# Patient Record
Sex: MALE | Race: WHITE | URBAN - METROPOLITAN AREA
[De-identification: names, ages, dates, MRNs, and addresses within clinical notes are randomized per-mention and may not be internally consistent; named-entity substitution may affect disease eponyms.]

---

## 2017-03-07 ENCOUNTER — PRE VISIT (OUTPATIENT)
Dept: CARDIOLOGY | Facility: CLINIC | Age: 82
End: 2017-03-07

## 2017-03-15 ENCOUNTER — OFFICE VISIT (OUTPATIENT)
Dept: CARDIOLOGY | Facility: CLINIC | Age: 82
End: 2017-03-15
Attending: INTERNAL MEDICINE
Payer: COMMERCIAL

## 2017-03-15 VITALS
SYSTOLIC BLOOD PRESSURE: 130 MMHG | HEIGHT: 66 IN | OXYGEN SATURATION: 96 % | WEIGHT: 169 LBS | HEART RATE: 55 BPM | BODY MASS INDEX: 27.16 KG/M2 | DIASTOLIC BLOOD PRESSURE: 80 MMHG

## 2017-03-15 DIAGNOSIS — I49.3 PVC (PREMATURE VENTRICULAR CONTRACTION): ICD-10-CM

## 2017-03-15 DIAGNOSIS — I48.20 CHRONIC ATRIAL FIBRILLATION (H): ICD-10-CM

## 2017-03-15 DIAGNOSIS — I43 CARDIOMYOPATHY IN DISEASES CLASSIFIED ELSEWHERE (H): ICD-10-CM

## 2017-03-15 DIAGNOSIS — I48.91 ATRIAL FIBRILLATION WITH RVR (H): ICD-10-CM

## 2017-03-15 DIAGNOSIS — I63.49 CEREBROVASCULAR ACCIDENT (CVA) DUE TO EMBOLISM OF OTHER CEREBRAL ARTERY (H): ICD-10-CM

## 2017-03-15 PROCEDURE — 99214 OFFICE O/P EST MOD 30 MIN: CPT | Performed by: INTERNAL MEDICINE

## 2017-03-15 NOTE — PROGRESS NOTES
"Cardiology Progress Note          Assessment and Plan:     1. Paroxysmal atrial fibrillation    Urged patient to resume Xarelto given his past history of stroke    Continue to watch for symptoms and recurrence.    Follow up in six months per patient's wife.    This note was transcribed using electronic voice recognition software and there may be typographical errors present.                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  He also had stroke.  He has self discontinued his Xarelto.  Thankfully he has not had a recurrent stroke.  He denies any tachycardia.  He has occasional palpitations.  He likely has chronotropic incompetence and stable dyspnea on exertion.                       Review of Systems:   Review of Systems:  Skin:  Negative     Eyes:  Positive for glasses  ENT:  Positive for hearing loss  Respiratory:  Positive for dyspnea on exertion  Cardiovascular:    Positive for;palpitations  Gastroenterology: Negative    Genitourinary:  not assessed    Musculoskeletal:  Positive for joint pain;arthritis  Neurologic:  Positive for stroke  Psychiatric:  Negative    Heme/Lymph/Imm:  Positive for allergies  Endocrine:  Negative                Physical Exam:     Vitals: /80  Pulse 55  Ht 1.676 m (5' 6\")  Wt 76.7 kg (169 lb)  SpO2 96%  BMI 27.28 kg/m2  Constitutional:  cooperative;alert and oriented   Non English speaking    Skin:  warm and dry to the touch        Head:  normocephalic, no masses or lesions        Eyes:  conjunctivae and lids unremarkable;sclera white        ENT:  no pallor or cyanosis, dentition good        Neck:  no carotid bruit;JVP normal        Chest:  clear to auscultation;normal symmetry        Cardiac: regular rhythm bradycardic     grade 1;systolic murmur;RUSB          Abdomen:      benign    Extremities and Back:  no edema;no " deformities, clubbing, cyanosis, erythema observed        Neurological:  affect appropriate, oriented to time, person and place                 Medications:     Current Outpatient Prescriptions   Medication Sig Dispense Refill     rivaroxaban ANTICOAGULANT (XARELTO) 15 MG TABS tablet Take 1 tablet (15 mg) by mouth daily (with dinner) 90 tablet 3     aspirin 81 MG tablet Take 1 tablet (81 mg) by mouth daily 30 tablet 11     Cholecalciferol (VITAMIN D3 PO) Take 50,000 Units by mouth every 7 days        [DISCONTINUED] rivaroxaban ANTICOAGULANT (XARELTO) 15 MG TABS tablet Take 1 tablet (15 mg) by mouth daily (with dinner) 30 tablet 11     polyethylene glycol (MIRALAX/GLYCOLAX) powder MIX 17GRAMS IN 8OZ OF WATER & DRINK TWICE A DAY AS NEEDED 1 Bottle 1                Data:   All laboratory data reviewed  No results found for this or any previous visit (from the past 24 hour(s)).    All laboratory data reviewed  Lab Results   Component Value Date    CHOL 153 02/04/2016     Lab Results   Component Value Date    HDL 66 02/04/2016     Lab Results   Component Value Date    LDL 65 02/04/2016     Lab Results   Component Value Date    TRIG 110 02/04/2016     Lab Results   Component Value Date    CHOLHDLRATIO 1.4 08/13/2015     TSH   Date Value Ref Range Status   03/20/2015 0.86 0.40 - 4.00 mU/L Final     Comment:     Effective 7/30/2014, the reference range for this assay has changed to reflect   new instrumentation/methodology.       Last Basic Metabolic Panel:  Lab Results   Component Value Date     02/04/2016      Lab Results   Component Value Date    POTASSIUM 4.3 02/04/2016     Lab Results   Component Value Date    CHLORIDE 106 02/04/2016     Lab Results   Component Value Date    JALEESA 9.2 02/04/2016     Lab Results   Component Value Date    CO2 23 05/18/2015     Lab Results   Component Value Date    BUN 29 02/04/2016     Lab Results   Component Value Date    CR 1.22 02/04/2016     Lab Results   Component Value Date     GLC 99 02/04/2016     Lab Results   Component Value Date    WBC 7.1 02/04/2016     Lab Results   Component Value Date    RBC 4.67 02/04/2016     Lab Results   Component Value Date    HGB 14.6 02/04/2016     Lab Results   Component Value Date    HCT 43.6 02/04/2016     Lab Results   Component Value Date    MCV 93 02/04/2016     Lab Results   Component Value Date    MCH 31.3 02/04/2016     Lab Results   Component Value Date    MCHC 33.5 02/04/2016     Lab Results   Component Value Date    RDW 12.8 02/04/2016     Lab Results   Component Value Date     02/04/2016

## 2017-03-15 NOTE — MR AVS SNAPSHOT
"              After Visit Summary   3/15/2017    Delta Otoole    MRN: [id number]           Patient Information     Date Of Birth          10/10/1931        Visit Information        Provider Department      3/15/2017 2:45 PM Chino Smith MD; [employer] [facility] AT [place]        Today's Diagnoses     Atrial fibrillation with RVR (H)        Cerebrovascular accident (CVA) due to embolism of other cerebral artery (H)        Cardiomyopathy in diseases classified elsewhere (H)        PVC (premature ventricular contraction)        Chronic atrial fibrillation (H)           Follow-ups after your visit        Additional Services     Follow-Up with Cardiologist                 Future tests that were ordered for you today     Open Future Orders        Priority Expected Expires Ordered    Follow-Up with Cardiologist Routine 9/11/2017 3/15/2018 3/15/2017            Who to contact     If you have questions or need follow up information about today's clinic visit or your schedule please contact [facility] HEART AT [place] directly at [phone].  Normal or non-critical lab and imaging results will be communicated to you by Nouvolahart, letter or phone within 4 business days after the clinic has received the results. If you do not hear from us within 7 days, please contact the clinic through [employer] or phone. If you have a critical or abnormal lab result, we will notify you by phone as soon as possible.  Submit refill requests through [employer] or call your pharmacy and they will forward the refill request to us. Please allow 3 business days for your refill to be completed.          Additional Information About Your Visit        Nouvolahart Information     [employer] lets you send messages to your doctor, view your test results, renew your prescriptions, schedule appointments and more. To sign up, go to [email or web address] . Click on \"Log in\" on the " "left side of the screen, which will take you to the Welcome page. Then click on \"Sign up Now\" on the right side of the page.     You will be asked to enter the access code listed below, as well as some personal information. Please follow the directions to create your username and password.     Your access code is: [email or web address]  Expires: 2017  3:17 PM     Your access code will  in 90 days. If you need help or a new code, please call your [facility] or [phone].        Care EveryWhere ID     This is your Care EveryWhere ID. This could be used by other organizations to access your [place] medical records  [id number]        Your Vitals Were     Pulse Height Pulse Oximetry BMI (Body Mass Index)          55 1.676 m (5' 6\") 96% 27.28 kg/m2         Blood Pressure from Last 3 Encounters:   03/15/17 130/80   16 138/80   16 134/84    Weight from Last 3 Encounters:   03/15/17 76.7 kg (169 lb)   16 73 kg (161 lb)   16 72.6 kg (160 lb)              We Performed the Following     Follow-Up with Cardiologist          Where to get your medicines      These medications were sent to [facility] [address] [facility] [address] AT [facility] [place] [address], [facility] [address]    Hours:  24-hours Phone:  [phone]     rivaroxaban ANTICOAGULANT 15 MG Tabs tablet          Primary Care Provider Office Phone # Fax #    Bony Castro [phone]       [facility] [address]  [facility] [address]        Thank you!     Thank you for choosing [facility] PHYSICIANS HE[facility] AT [place]  for your care. Our goal is always to provide you with excellent care. Hearing back from our patients is one way we can continue to improve our services. Please take a few minutes to complete the written survey that you may receive in the mail after your visit with us. Thank you!             Your Updated Medication List - Protect " others around you: Learn how to safely use, store and throw away your medicines at www.disposemymeds.org.          This list is accurate as of: 3/15/17  3:17 PM.  Always use your most recent med list.                   Brand Name Dispense Instructions for use    aspirin 81 MG tablet     30 tablet    Take 1 tablet (81 mg) by mouth daily       polyethylene glycol powder    MIRALAX/GLYCOLAX    1 Bottle    MIX 17GRAMS IN 8OZ OF WATER & DRINK TWICE A DAY AS NEEDED       rivaroxaban ANTICOAGULANT 15 MG Tabs tablet    XARELTO    90 tablet    Take 1 tablet (15 mg) by mouth daily (with dinner)       VITAMIN D3 PO      Take 50,000 Units by mouth every 7 days

## 2017-03-15 NOTE — LETTER
"3/15/2017    Bony Castro MD  [facility]   [address]  [facility] [address]    RE: Delta Otoole       Dear Colleague,    I had the pleasure of seeing Delta Otoole in the [facility].    Cardiology Progress Note          Assessment and Plan:     1. Paroxysmal atrial fibrillation    Urged patient to resume Xarelto given his past history of stroke    Continue to watch for symptoms and recurrence.    Follow up in six months per patient's wife.    This note was transcribed using electronic voice recognition software and there may be typographical errors present.                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  He also had stroke.  He has self discontinued his Xarelto.  Thankfully he has not had a recurrent stroke.  He denies any tachycardia.  He has occasional palpitations.  He likely has chronotropic incompetence and stable dyspnea on exertion.                       Review of Systems:   Review of Systems:  Skin:  Negative     Eyes:  Positive for glasses  ENT:  Positive for hearing loss  Respiratory:  Positive for dyspnea on exertion  Cardiovascular:    Positive for;palpitations  Gastroenterology: Negative    Genitourinary:  not assessed    Musculoskeletal:  Positive for joint pain;arthritis  Neurologic:  Positive for stroke  Psychiatric:  Negative    Heme/Lymph/Imm:  Positive for allergies  Endocrine:  Negative                Physical Exam:     Vitals: /80  Pulse 55  Ht 1.676 m (5' 6\")  Wt 76.7 kg (169 lb)  SpO2 96%  BMI 27.28 kg/m2  Constitutional:  cooperative;alert and oriented   Non English speaking    Skin:  warm and dry to the touch        Head:  normocephalic, no masses or lesions        Eyes:  conjunctivae and lids unremarkable;sclera white        ENT:  no pallor or cyanosis, dentition good    "     Neck:  no carotid bruit;JVP normal        Chest:  clear to auscultation;normal symmetry        Cardiac: regular rhythm bradycardic     grade 1;systolic murmur;RUSB          Abdomen:      benign    Extremities and Back:  no edema;no deformities, clubbing, cyanosis, erythema observed        Neurological:  affect appropriate, oriented to time, person and place                 Medications:     Current Outpatient Prescriptions   Medication Sig Dispense Refill     rivaroxaban ANTICOAGULANT (XARELTO) 15 MG TABS tablet Take 1 tablet (15 mg) by mouth daily (with dinner) 90 tablet 3     aspirin 81 MG tablet Take 1 tablet (81 mg) by mouth daily 30 tablet 11     Cholecalciferol (VITAMIN D3 PO) Take 50,000 Units by mouth every 7 days        [DISCONTINUED] rivaroxaban ANTICOAGULANT (XARELTO) 15 MG TABS tablet Take 1 tablet (15 mg) by mouth daily (with dinner) 30 tablet 11     polyethylene glycol (MIRALAX/GLYCOLAX) powder MIX 17GRAMS IN 8OZ OF WATER & DRINK TWICE A DAY AS NEEDED 1 Bottle 1                Data:   All laboratory data reviewed  No results found for this or any previous visit (from the past 24 hour(s)).    All laboratory data reviewed  Lab Results   Component Value Date    CHOL 153 02/04/2016     Lab Results   Component Value Date    HDL 66 02/04/2016     Lab Results   Component Value Date    LDL 65 02/04/2016     Lab Results   Component Value Date    TRIG 110 02/04/2016     Lab Results   Component Value Date    CHOLHDLRATIO 1.4 08/13/2015     TSH   Date Value Ref Range Status   03/20/2015 0.86 0.40 - 4.00 mU/L Final     Comment:     Effective 7/30/2014, the reference range for this assay has changed to reflect   new instrumentation/methodology.       Last Basic Metabolic Panel:  Lab Results   Component Value Date     02/04/2016      Lab Results   Component Value Date    POTASSIUM 4.3 02/04/2016     Lab Results   Component Value Date    CHLORIDE 106 02/04/2016     Lab Results   Component Value Date    JALEESA  9.2 02/04/2016     Lab Results   Component Value Date    CO2 23 05/18/2015     Lab Results   Component Value Date    BUN 29 02/04/2016     Lab Results   Component Value Date    CR 1.22 02/04/2016     Lab Results   Component Value Date    GLC 99 02/04/2016     Lab Results   Component Value Date    WBC 7.1 02/04/2016     Lab Results   Component Value Date    RBC 4.67 02/04/2016     Lab Results   Component Value Date    HGB 14.6 02/04/2016     Lab Results   Component Value Date    HCT 43.6 02/04/2016     Lab Results   Component Value Date    MCV 93 02/04/2016     Lab Results   Component Value Date    MCH 31.3 02/04/2016     Lab Results   Component Value Date    MCHC 33.5 02/04/2016     Lab Results   Component Value Date    RDW 12.8 02/04/2016     Lab Results   Component Value Date     02/04/2016     Thank you for allowing me to participate in the care of your patient.    Sincerely,     Chino Smith MD     [facility]

## 2017-09-29 ENCOUNTER — OFFICE VISIT (OUTPATIENT)
Dept: CARDIOLOGY | Facility: CLINIC | Age: 82
End: 2017-09-29
Attending: INTERNAL MEDICINE
Payer: COMMERCIAL

## 2017-09-29 VITALS
WEIGHT: 168 LBS | DIASTOLIC BLOOD PRESSURE: 82 MMHG | BODY MASS INDEX: 27 KG/M2 | SYSTOLIC BLOOD PRESSURE: 147 MMHG | HEIGHT: 66 IN | HEART RATE: 58 BPM

## 2017-09-29 DIAGNOSIS — I48.20 CHRONIC ATRIAL FIBRILLATION (H): ICD-10-CM

## 2017-09-29 DIAGNOSIS — I43 CARDIOMYOPATHY IN DISEASES CLASSIFIED ELSEWHERE (H): ICD-10-CM

## 2017-09-29 DIAGNOSIS — I63.49 CEREBROVASCULAR ACCIDENT (CVA) DUE TO EMBOLISM OF OTHER CEREBRAL ARTERY (H): ICD-10-CM

## 2017-09-29 DIAGNOSIS — I49.3 PVC (PREMATURE VENTRICULAR CONTRACTION): ICD-10-CM

## 2017-09-29 DIAGNOSIS — I48.91 ATRIAL FIBRILLATION WITH RVR (H): ICD-10-CM

## 2017-09-29 PROCEDURE — 99213 OFFICE O/P EST LOW 20 MIN: CPT | Performed by: INTERNAL MEDICINE

## 2017-09-29 NOTE — MR AVS SNAPSHOT
"              After Visit Summary   9/29/2017    Delta Otoole    MRN: [id number]           Patient Information     Date Of Birth          10/10/1931        Visit Information        Provider Department      9/29/2017 9:00 AM Chino Smith MD; [employer] [facility] PH[facility] AT [place]        Today's Diagnoses     Atrial fibrillation with RVR (H)        Cerebrovascular accident (CVA) due to embolism of other cerebral artery (H)        Cardiomyopathy in diseases classified elsewhere (H)        PVC (premature ventricular contraction)        Chronic atrial fibrillation (H)           Follow-ups after your visit        Additional Services     Follow-Up with Cardiologist                 Future tests that were ordered for you today     Open Future Orders        Priority Expected Expires Ordered    Follow-Up with Cardiologist Routine 9/29/2018 9/30/2018 9/29/2017            Who to contact     If you have questions or need follow up information about today's clinic visit or your schedule please contact [facility] AT [place] directly at [phone].  Normal or non-critical lab and imaging results will be communicated to you by Janeevahart, letter or phone within 4 business days after the clinic has received the results. If you do not hear from us within 7 days, please contact the clinic through [employer] or phone. If you have a critical or abnormal lab result, we will notify you by phone as soon as possible.  Submit refill requests through [employer] or call your pharmacy and they will forward the refill request to us. Please allow 3 business days for your refill to be completed.          Additional Information About Your Visit        Janeevahart Information     [employer] lets you send messages to your doctor, view your test results, renew your prescriptions, schedule appointments and more. To sign up, go to [email or web address] . Click on \"Log in\" on the " "left side of the screen, which will take you to the Welcome page. Then click on \"Sign up Now\" on the right side of the page.     You will be asked to enter the access code listed below, as well as some personal information. Please follow the directions to create your username and password.     Your access code is: [email or web address]  Expires: 2017  9:44 AM     Your access code will  in 90 days. If you need help or a new code, please call your [place] clinic or [phone].        Care EveryWhere ID     This is your Care EveryWhere ID. This could be used by other organizations to access your [place] medical records  [id number]        Your Vitals Were     Pulse Height BMI (Body Mass Index)             58 1.676 m (5' 6\") 27.12 kg/m2          Blood Pressure from Last 3 Encounters:   17 147/82   03/15/17 130/80   16 138/80    Weight from Last 3 Encounters:   17 76.2 kg (168 lb)   03/15/17 76.7 kg (169 lb)   16 73 kg (161 lb)              We Performed the Following     Follow-Up with Cardiologist        Primary Care Provider Office Phone # Fax #    Bony Castro [phone]       [facility] [address] [address]        Equal Access to Services     [facility]: Hadii aad ku hadasho Soomaali, waaxda luqadaha, qaybta kaalmada adeegyada, bin nguyễn . So [facility] [phone].    ATENCIÓN: Si habla español, tiene a welsh disposición servicios gratuitos de asistencia lingüística. Llame al [phone].    We comply with applicable federal civil rights laws and [place] laws. We do not discriminate on the basis of race, color, national origin, age, disability sex, sexual orientation or gender identity.            Thank you!     Thank you for choosing [facility] PHYS[facility] HE[facility] AT [place]  for your care. Our goal is always to provide you with excellent care. Hearing back from our patients is one way we can " continue to improve our services. Please take a few minutes to complete the written survey that you may receive in the mail after your visit with us. Thank you!             Your Updated Medication List - Protect others around you: Learn how to safely use, store and throw away your medicines at www.disposemymeds.org.          This list is accurate as of: 9/29/17  9:44 AM.  Always use your most recent med list.                   Brand Name Dispense Instructions for use Diagnosis    aspirin 81 MG tablet     30 tablet    Take 1 tablet (81 mg) by mouth daily    Chronic atrial fibrillation (H), NSTEMI (non-ST elevated myocardial infarction) (H)       polyethylene glycol powder    MIRALAX/GLYCOLAX    1 Bottle    MIX 17GRAMS IN 8OZ OF WATER & DRINK TWICE A DAY AS NEEDED    Chronic constipation       rivaroxaban ANTICOAGULANT 15 MG Tabs tablet    XARELTO    90 tablet    Take 1 tablet (15 mg) by mouth daily (with dinner)    Chronic atrial fibrillation (H)       VITAMIN D3 PO      Take 50,000 Units by mouth every 7 days

## 2017-09-29 NOTE — LETTER
"9/29/2017    Bony Castro MD  [facility]   [address]  [facility] [address]    RE: Delta OBI Sydnie       Dear Colleague,    I had the pleasure of seeing Delta Otoole in the [facility].    Cardiology Progress Note          Assessment and Plan:     1. Paroxysmal atrial fibrillation with history of tachycardia induced cardiomyopathy, resolved.  Also stroke when not anticoagulated    He remains on anticoagulation.  No new stroke symptoms.    No palpitations.  Feels well.  Continue medical therapy.    Follow-up in one year      This note was transcribed using electronic voice recognition software and there may be typographical errors present.                Interval History:   The patient is a very pleasant 85 year old Portuguese male whom I have seen previously for tachycardia induced cardiomyopathy in the setting of atrial fibrillation.  He got cardioverted and has pretty much stayed in sinus rhythm as far as we can tell.  He remains on anticoagulation.  He has stable dyspnea on exertion that is likely related to chronotropic incompetence.  Currently denies any palpitations.  No lightheadedness or presyncope.  Granddaughter and wife endorse that he is doing well.                       Review of Systems:   Review of Systems:  Skin:  Negative     Eyes:  Positive for glasses  ENT:  Positive for hearing loss  Respiratory:  Positive for dyspnea on exertion  Cardiovascular:    Positive for;lightheadedness  Gastroenterology: Negative    Genitourinary:  not assessed    Musculoskeletal:  Positive for arthritis  Neurologic:  Positive for stroke  Psychiatric:  Negative    Heme/Lymph/Imm:  Positive for allergies  Endocrine:  Negative                Physical Exam:     Vitals: /82  Pulse 58  Ht 1.676 m (5' 6\")  Wt 76.2 kg (168 lb)  BMI 27.12 kg/m2  Constitutional:  cooperative;alert and oriented   Non English speaking    Skin:  warm and dry to the touch        Head:  " normocephalic, no masses or lesions        Eyes:  conjunctivae and lids unremarkable;sclera white        ENT:  no pallor or cyanosis, dentition good        Neck:  no carotid bruit;JVP normal        Chest:  clear to auscultation;normal symmetry        Cardiac: regular rhythm bradycardic     grade 1;systolic murmur;RUSB          Abdomen:      benign    Extremities and Back:  no edema;no deformities, clubbing, cyanosis, erythema observed        Neurological:  affect appropriate, oriented to time, person and place                 Medications:     Current Outpatient Prescriptions   Medication Sig Dispense Refill     rivaroxaban ANTICOAGULANT (XARELTO) 15 MG TABS tablet Take 1 tablet (15 mg) by mouth daily (with dinner) 90 tablet 3     aspirin 81 MG tablet Take 1 tablet (81 mg) by mouth daily 30 tablet 11     Cholecalciferol (VITAMIN D3 PO) Take 50,000 Units by mouth every 7 days        polyethylene glycol (MIRALAX/GLYCOLAX) powder MIX 17GRAMS IN 8OZ OF WATER & DRINK TWICE A DAY AS NEEDED 1 Bottle 1                Data:   All laboratory data reviewed  No results found for this or any previous visit (from the past 24 hour(s)).    All laboratory data reviewed  Lab Results   Component Value Date    CHOL 153 02/04/2016     Lab Results   Component Value Date    HDL 66 02/04/2016     Lab Results   Component Value Date    LDL 65 02/04/2016     Lab Results   Component Value Date    TRIG 110 02/04/2016     Lab Results   Component Value Date    CHOLHDLRATIO 1.4 08/13/2015     TSH   Date Value Ref Range Status   03/20/2015 0.86 0.40 - 4.00 mU/L Final     Comment:     Effective 7/30/2014, the reference range for this assay has changed to reflect   new instrumentation/methodology.       Last Basic Metabolic Panel:  Lab Results   Component Value Date     02/04/2016      Lab Results   Component Value Date    POTASSIUM 4.3 02/04/2016     Lab Results   Component Value Date    CHLORIDE 106 02/04/2016     Lab Results   Component Value  Date    JALEESA 9.2 02/04/2016     Lab Results   Component Value Date    CO2 23 05/18/2015     Lab Results   Component Value Date    BUN 29 02/04/2016     Lab Results   Component Value Date    CR 1.22 02/04/2016     Lab Results   Component Value Date    GLC 99 02/04/2016     Lab Results   Component Value Date    WBC 7.1 02/04/2016     Lab Results   Component Value Date    RBC 4.67 02/04/2016     Lab Results   Component Value Date    HGB 14.6 02/04/2016     Lab Results   Component Value Date    HCT 43.6 02/04/2016     Lab Results   Component Value Date    MCV 93 02/04/2016     Lab Results   Component Value Date    MCH 31.3 02/04/2016     Lab Results   Component Value Date    MCHC 33.5 02/04/2016     Lab Results   Component Value Date    RDW 12.8 02/04/2016     Lab Results   Component Value Date     02/04/2016   Thank you for allowing me to participate in the care of your patient.    Sincerely,     Chino Smith MD     [facility]

## 2017-09-29 NOTE — PROGRESS NOTES
"Cardiology Progress Note          Assessment and Plan:     1. Paroxysmal atrial fibrillation with history of tachycardia induced cardiomyopathy, resolved.  Also stroke when not anticoagulated    He remains on anticoagulation.  No new stroke symptoms.    No palpitations.  Feels well.  Continue medical therapy.    Follow-up in one year      This note was transcribed using electronic voice recognition software and there may be typographical errors present.                Interval History:   The patient is a very pleasant 85 year old Citizen of [place] male whom I have seen previously for tachycardia induced cardiomyopathy in the setting of atrial fibrillation.  He got cardioverted and has pretty much stayed in sinus rhythm as far as we can tell.  He remains on anticoagulation.  He has stable dyspnea on exertion that is likely related to chronotropic incompetence.  Currently denies any palpitations.  No lightheadedness or presyncope.  Granddaughter and wife endorse that he is doing well.                       Review of Systems:   Review of Systems:  Skin:  Negative     Eyes:  Positive for glasses  ENT:  Positive for hearing loss  Respiratory:  Positive for dyspnea on exertion  Cardiovascular:    Positive for;lightheadedness  Gastroenterology: Negative    Genitourinary:  not assessed    Musculoskeletal:  Positive for arthritis  Neurologic:  Positive for stroke  Psychiatric:  Negative    Heme/Lymph/Imm:  Positive for allergies  Endocrine:  Negative                Physical Exam:     Vitals: /82  Pulse 58  Ht 1.676 m (5' 6\")  Wt 76.2 kg (168 lb)  BMI 27.12 kg/m2  Constitutional:  cooperative;alert and oriented   Non English speaking    Skin:  warm and dry to the touch        Head:  normocephalic, no masses or lesions        Eyes:  conjunctivae and lids unremarkable;sclera white        ENT:  no pallor or cyanosis, dentition good        Neck:  no carotid bruit;JVP normal        Chest:  clear to auscultation;normal symmetry    "     Cardiac: regular rhythm bradycardic     grade 1;systolic murmur;RUSB          Abdomen:      benign    Extremities and Back:  no edema;no deformities, clubbing, cyanosis, erythema observed        Neurological:  affect appropriate, oriented to time, person and place                 Medications:     Current Outpatient Prescriptions   Medication Sig Dispense Refill     rivaroxaban ANTICOAGULANT (XARELTO) 15 MG TABS tablet Take 1 tablet (15 mg) by mouth daily (with dinner) 90 tablet 3     aspirin 81 MG tablet Take 1 tablet (81 mg) by mouth daily 30 tablet 11     Cholecalciferol (VITAMIN D3 PO) Take 50,000 Units by mouth every 7 days        polyethylene glycol (MIRALAX/GLYCOLAX) powder MIX 17GRAMS IN 8OZ OF WATER & DRINK TWICE A DAY AS NEEDED 1 Bottle 1                Data:   All laboratory data reviewed  No results found for this or any previous visit (from the past 24 hour(s)).    All laboratory data reviewed  Lab Results   Component Value Date    CHOL 153 02/04/2016     Lab Results   Component Value Date    HDL 66 02/04/2016     Lab Results   Component Value Date    LDL 65 02/04/2016     Lab Results   Component Value Date    TRIG 110 02/04/2016     Lab Results   Component Value Date    CHOLHDLRATIO 1.4 08/13/2015     TSH   Date Value Ref Range Status   03/20/2015 0.86 0.40 - 4.00 mU/L Final     Comment:     Effective 7/30/2014, the reference range for this assay has changed to reflect   new instrumentation/methodology.       Last Basic Metabolic Panel:  Lab Results   Component Value Date     02/04/2016      Lab Results   Component Value Date    POTASSIUM 4.3 02/04/2016     Lab Results   Component Value Date    CHLORIDE 106 02/04/2016     Lab Results   Component Value Date    JALEESA 9.2 02/04/2016     Lab Results   Component Value Date    CO2 23 05/18/2015     Lab Results   Component Value Date    BUN 29 02/04/2016     Lab Results   Component Value Date    CR 1.22 02/04/2016     Lab Results   Component Value Date     GLC 99 02/04/2016     Lab Results   Component Value Date    WBC 7.1 02/04/2016     Lab Results   Component Value Date    RBC 4.67 02/04/2016     Lab Results   Component Value Date    HGB 14.6 02/04/2016     Lab Results   Component Value Date    HCT 43.6 02/04/2016     Lab Results   Component Value Date    MCV 93 02/04/2016     Lab Results   Component Value Date    MCH 31.3 02/04/2016     Lab Results   Component Value Date    MCHC 33.5 02/04/2016     Lab Results   Component Value Date    RDW 12.8 02/04/2016     Lab Results   Component Value Date     02/04/2016

## 2018-07-21 ENCOUNTER — APPOINTMENT (OUTPATIENT)
Dept: CT IMAGING | Facility: CLINIC | Age: 83
DRG: 023 | End: 2018-07-21
Attending: EMERGENCY MEDICINE
Payer: COMMERCIAL

## 2018-07-21 ENCOUNTER — APPOINTMENT (OUTPATIENT)
Dept: CT IMAGING | Facility: CLINIC | Age: 83
DRG: 023 | End: 2018-07-21
Attending: RADIOLOGY
Payer: COMMERCIAL

## 2018-07-21 ENCOUNTER — APPOINTMENT (OUTPATIENT)
Dept: INTERVENTIONAL RADIOLOGY/VASCULAR | Facility: CLINIC | Age: 83
DRG: 023 | End: 2018-07-21
Attending: PSYCHIATRY & NEUROLOGY
Payer: COMMERCIAL

## 2018-07-21 ENCOUNTER — HOSPITAL ENCOUNTER (INPATIENT)
Facility: CLINIC | Age: 83
LOS: 4 days | Discharge: HOSPICE | DRG: 023 | End: 2018-07-25
Attending: EMERGENCY MEDICINE | Admitting: HOSPITALIST
Payer: COMMERCIAL

## 2018-07-21 DIAGNOSIS — I63.511 ACUTE ISCHEMIC RIGHT MCA STROKE (H): Primary | ICD-10-CM

## 2018-07-21 DIAGNOSIS — Z51.5 END OF LIFE CARE: ICD-10-CM

## 2018-07-21 PROBLEM — I63.9 STROKE (H): Status: ACTIVE | Noted: 2018-07-21

## 2018-07-21 LAB
ABO + RH BLD: NORMAL
ANION GAP SERPL CALCULATED.3IONS-SCNC: 6 MMOL/L (ref 3–14)
APTT PPP: 26 SEC (ref 22–37)
APTT PPP: 38 SEC (ref 22–37)
BASOPHILS # BLD AUTO: 0 10E9/L (ref 0–0.2)
BASOPHILS NFR BLD AUTO: 0.4 %
BLD GP AB SCN SERPL QL: NORMAL
BLD PROD TYP BPU: NORMAL
BLD UNIT ID BPU: 0
BLD UNIT ID BPU: 0
BLOOD BANK CMNT PATIENT-IMP: NORMAL
BLOOD PRODUCT CODE: NORMAL
BLOOD PRODUCT CODE: NORMAL
BPU ID: NORMAL
BPU ID: NORMAL
BUN SERPL-MCNC: 26 MG/DL (ref 7–30)
CALCIUM SERPL-MCNC: 8.8 MG/DL (ref 8.5–10.1)
CHLORIDE SERPL-SCNC: 105 MMOL/L (ref 94–109)
CO2 SERPL-SCNC: 28 MMOL/L (ref 20–32)
CREAT SERPL-MCNC: 1.12 MG/DL (ref 0.66–1.25)
DIFFERENTIAL METHOD BLD: NORMAL
EOSINOPHIL # BLD AUTO: 0.1 10E9/L (ref 0–0.7)
EOSINOPHIL NFR BLD AUTO: 0.9 %
ERYTHROCYTE [DISTWIDTH] IN BLOOD BY AUTOMATED COUNT: 12.7 % (ref 10–15)
FIBRINOGEN PPP-MCNC: 245 MG/DL (ref 200–420)
GFR SERPL CREATININE-BSD FRML MDRD: 62 ML/MIN/1.7M2
GLUCOSE BLDC GLUCOMTR-MCNC: 88 MG/DL (ref 70–99)
GLUCOSE SERPL-MCNC: 93 MG/DL (ref 70–99)
HCT VFR BLD AUTO: 41.4 % (ref 40–53)
HGB BLD-MCNC: 14.2 G/DL (ref 13.3–17.7)
IMM GRANULOCYTES # BLD: 0 10E9/L (ref 0–0.4)
IMM GRANULOCYTES NFR BLD: 0.4 %
INR PPP: 1.14 (ref 0.86–1.14)
INR PPP: 1.47 (ref 0.86–1.14)
INTERPRETATION ECG - MUSE: NORMAL
LYMPHOCYTES # BLD AUTO: 1.2 10E9/L (ref 0.8–5.3)
LYMPHOCYTES NFR BLD AUTO: 20.9 %
MCH RBC QN AUTO: 32.1 PG (ref 26.5–33)
MCHC RBC AUTO-ENTMCNC: 34.3 G/DL (ref 31.5–36.5)
MCV RBC AUTO: 94 FL (ref 78–100)
MONOCYTES # BLD AUTO: 0.9 10E9/L (ref 0–1.3)
MONOCYTES NFR BLD AUTO: 15.4 %
NEUTROPHILS # BLD AUTO: 3.5 10E9/L (ref 1.6–8.3)
NEUTROPHILS NFR BLD AUTO: 62 %
NRBC # BLD AUTO: 0 10*3/UL
NRBC BLD AUTO-RTO: 0 /100
NUM BPU REQUESTED: 2
PLATELET # BLD AUTO: 234 10E9/L (ref 150–450)
POTASSIUM SERPL-SCNC: 4.6 MMOL/L (ref 3.4–5.3)
RADIOLOGIST FLAGS: ABNORMAL
RBC # BLD AUTO: 4.42 10E12/L (ref 4.4–5.9)
SODIUM SERPL-SCNC: 139 MMOL/L (ref 133–144)
SPECIMEN EXP DATE BLD: NORMAL
SPECIMEN EXP DATE BLD: NORMAL
TRANSFUSION STATUS PATIENT QL: NORMAL
WBC # BLD AUTO: 5.7 10E9/L (ref 4–11)

## 2018-07-21 PROCEDURE — 85730 THROMBOPLASTIN TIME PARTIAL: CPT | Performed by: PSYCHIATRY & NEUROLOGY

## 2018-07-21 PROCEDURE — 70450 CT HEAD/BRAIN W/O DYE: CPT | Mod: 77

## 2018-07-21 PROCEDURE — 25000128 H RX IP 250 OP 636

## 2018-07-21 PROCEDURE — C1769 GUIDE WIRE: HCPCS

## 2018-07-21 PROCEDURE — 27210906 ZZH KIT CR8

## 2018-07-21 PROCEDURE — 25000128 H RX IP 250 OP 636: Performed by: EMERGENCY MEDICINE

## 2018-07-21 PROCEDURE — 86901 BLOOD TYPING SEROLOGIC RH(D): CPT | Performed by: PSYCHIATRY & NEUROLOGY

## 2018-07-21 PROCEDURE — 27211192 ZZ H SHEATH CR14

## 2018-07-21 PROCEDURE — 99291 CRITICAL CARE FIRST HOUR: CPT | Mod: 25

## 2018-07-21 PROCEDURE — 96374 THER/PROPH/DIAG INJ IV PUSH: CPT | Mod: 59

## 2018-07-21 PROCEDURE — 85384 FIBRINOGEN ACTIVITY: CPT | Performed by: PSYCHIATRY & NEUROLOGY

## 2018-07-21 PROCEDURE — 20000003 ZZH R&B ICU

## 2018-07-21 PROCEDURE — 25000128 H RX IP 250 OP 636: Performed by: HOSPITALIST

## 2018-07-21 PROCEDURE — 27210742 ZZH CATH CR1

## 2018-07-21 PROCEDURE — 25000125 ZZHC RX 250: Performed by: RADIOLOGY

## 2018-07-21 PROCEDURE — 96375 TX/PRO/DX INJ NEW DRUG ADDON: CPT

## 2018-07-21 PROCEDURE — P9012 CRYOPRECIPITATE EACH UNIT: HCPCS | Performed by: HOSPITALIST

## 2018-07-21 PROCEDURE — 25000128 H RX IP 250 OP 636: Performed by: RADIOLOGY

## 2018-07-21 PROCEDURE — 51702 INSERT TEMP BLADDER CATH: CPT

## 2018-07-21 PROCEDURE — 80048 BASIC METABOLIC PNL TOTAL CA: CPT | Performed by: PEDIATRICS

## 2018-07-21 PROCEDURE — 70470 CT HEAD/BRAIN W/O & W/DYE: CPT | Mod: XS

## 2018-07-21 PROCEDURE — 99153 MOD SED SAME PHYS/QHP EA: CPT

## 2018-07-21 PROCEDURE — 03CG3ZZ EXTIRPATION OF MATTER FROM INTRACRANIAL ARTERY, PERCUTANEOUS APPROACH: ICD-10-PCS | Performed by: RADIOLOGY

## 2018-07-21 PROCEDURE — 40000344 ZZHCL STATISTIC THAWING COMPONENT: Performed by: HOSPITALIST

## 2018-07-21 PROCEDURE — 70450 CT HEAD/BRAIN W/O DYE: CPT

## 2018-07-21 PROCEDURE — 00000146 ZZHCL STATISTIC GLUCOSE BY METER IP

## 2018-07-21 PROCEDURE — 86850 RBC ANTIBODY SCREEN: CPT | Performed by: PSYCHIATRY & NEUROLOGY

## 2018-07-21 PROCEDURE — 93005 ELECTROCARDIOGRAM TRACING: CPT

## 2018-07-21 PROCEDURE — 70496 CT ANGIOGRAPHY HEAD: CPT

## 2018-07-21 PROCEDURE — 85610 PROTHROMBIN TIME: CPT | Performed by: PEDIATRICS

## 2018-07-21 PROCEDURE — 99292 CRITICAL CARE ADDL 30 MIN: CPT

## 2018-07-21 PROCEDURE — 85610 PROTHROMBIN TIME: CPT | Performed by: PSYCHIATRY & NEUROLOGY

## 2018-07-21 PROCEDURE — 3E05317 INTRODUCTION OF OTHER THROMBOLYTIC INTO PERIPHERAL ARTERY, PERCUTANEOUS APPROACH: ICD-10-PCS | Performed by: RADIOLOGY

## 2018-07-21 PROCEDURE — C1887 CATHETER, GUIDING: HCPCS

## 2018-07-21 PROCEDURE — 86900 BLOOD TYPING SEROLOGIC ABO: CPT | Performed by: PSYCHIATRY & NEUROLOGY

## 2018-07-21 PROCEDURE — 70498 CT ANGIOGRAPHY NECK: CPT

## 2018-07-21 PROCEDURE — 85730 THROMBOPLASTIN TIME PARTIAL: CPT | Performed by: PEDIATRICS

## 2018-07-21 PROCEDURE — 85025 COMPLETE CBC W/AUTO DIFF WBC: CPT | Performed by: PEDIATRICS

## 2018-07-21 PROCEDURE — 99223 1ST HOSP IP/OBS HIGH 75: CPT | Mod: AI | Performed by: HOSPITALIST

## 2018-07-21 PROCEDURE — 25000125 ZZHC RX 250: Performed by: HOSPITALIST

## 2018-07-21 PROCEDURE — 25000125 ZZHC RX 250: Performed by: EMERGENCY MEDICINE

## 2018-07-21 PROCEDURE — 27210732 ZZH ACCESSORY CR1

## 2018-07-21 RX ORDER — SODIUM CHLORIDE 9 MG/ML
INJECTION, SOLUTION INTRAVENOUS CONTINUOUS
Status: DISCONTINUED | OUTPATIENT
Start: 2018-07-21 | End: 2018-07-21

## 2018-07-21 RX ORDER — ONDANSETRON 4 MG/1
4 TABLET, ORALLY DISINTEGRATING ORAL EVERY 6 HOURS PRN
Status: DISCONTINUED | OUTPATIENT
Start: 2018-07-21 | End: 2018-07-22

## 2018-07-21 RX ORDER — NALOXONE HYDROCHLORIDE 0.4 MG/ML
.1-.4 INJECTION, SOLUTION INTRAMUSCULAR; INTRAVENOUS; SUBCUTANEOUS
Status: DISCONTINUED | OUTPATIENT
Start: 2018-07-21 | End: 2018-07-21

## 2018-07-21 RX ORDER — FENTANYL CITRATE 50 UG/ML
INJECTION, SOLUTION INTRAMUSCULAR; INTRAVENOUS
Status: DISCONTINUED
Start: 2018-07-21 | End: 2018-07-21 | Stop reason: HOSPADM

## 2018-07-21 RX ORDER — POLYETHYLENE GLYCOL 3350 17 G/17G
17 POWDER, FOR SOLUTION ORAL DAILY PRN
Status: ON HOLD | COMMUNITY
End: 2018-07-24

## 2018-07-21 RX ORDER — PROCHLORPERAZINE 25 MG
12.5 SUPPOSITORY, RECTAL RECTAL EVERY 12 HOURS PRN
Status: DISCONTINUED | OUTPATIENT
Start: 2018-07-21 | End: 2018-07-21

## 2018-07-21 RX ORDER — DEXTROSE MONOHYDRATE 25 G/50ML
25-50 INJECTION, SOLUTION INTRAVENOUS
Status: DISCONTINUED | OUTPATIENT
Start: 2018-07-21 | End: 2018-07-25 | Stop reason: HOSPADM

## 2018-07-21 RX ORDER — PROCHLORPERAZINE MALEATE 5 MG
5 TABLET ORAL EVERY 6 HOURS PRN
Status: DISCONTINUED | OUTPATIENT
Start: 2018-07-21 | End: 2018-07-21

## 2018-07-21 RX ORDER — METOCLOPRAMIDE HYDROCHLORIDE 5 MG/ML
5 INJECTION INTRAMUSCULAR; INTRAVENOUS EVERY 6 HOURS PRN
Status: DISCONTINUED | OUTPATIENT
Start: 2018-07-21 | End: 2018-07-25 | Stop reason: HOSPADM

## 2018-07-21 RX ORDER — IOPAMIDOL 755 MG/ML
120 INJECTION, SOLUTION INTRAVASCULAR ONCE
Status: COMPLETED | OUTPATIENT
Start: 2018-07-21 | End: 2018-07-21

## 2018-07-21 RX ORDER — NICOTINE POLACRILEX 4 MG
15-30 LOZENGE BUCCAL
Status: DISCONTINUED | OUTPATIENT
Start: 2018-07-21 | End: 2018-07-25 | Stop reason: HOSPADM

## 2018-07-21 RX ORDER — NALOXONE HYDROCHLORIDE 0.4 MG/ML
.1-.4 INJECTION, SOLUTION INTRAMUSCULAR; INTRAVENOUS; SUBCUTANEOUS
Status: DISCONTINUED | OUTPATIENT
Start: 2018-07-21 | End: 2018-07-25 | Stop reason: HOSPADM

## 2018-07-21 RX ORDER — ACETAMINOPHEN 650 MG/1
650 SUPPOSITORY RECTAL EVERY 4 HOURS PRN
Status: DISCONTINUED | OUTPATIENT
Start: 2018-07-21 | End: 2018-07-25 | Stop reason: HOSPADM

## 2018-07-21 RX ORDER — LIDOCAINE HYDROCHLORIDE 10 MG/ML
1-30 INJECTION, SOLUTION EPIDURAL; INFILTRATION; INTRACAUDAL; PERINEURAL
Status: COMPLETED | OUTPATIENT
Start: 2018-07-21 | End: 2018-07-21

## 2018-07-21 RX ORDER — METOCLOPRAMIDE 5 MG/1
5 TABLET ORAL EVERY 6 HOURS PRN
Status: DISCONTINUED | OUTPATIENT
Start: 2018-07-21 | End: 2018-07-25 | Stop reason: HOSPADM

## 2018-07-21 RX ORDER — FLUMAZENIL 0.1 MG/ML
0.2 INJECTION, SOLUTION INTRAVENOUS
Status: DISCONTINUED | OUTPATIENT
Start: 2018-07-21 | End: 2018-07-21

## 2018-07-21 RX ORDER — IOPAMIDOL 612 MG/ML
50 INJECTION, SOLUTION INTRAVASCULAR ONCE
Status: COMPLETED | OUTPATIENT
Start: 2018-07-21 | End: 2018-07-21

## 2018-07-21 RX ORDER — METOPROLOL TARTRATE 1 MG/ML
2.5 INJECTION, SOLUTION INTRAVENOUS EVERY 4 HOURS PRN
Status: DISCONTINUED | OUTPATIENT
Start: 2018-07-21 | End: 2018-07-22

## 2018-07-21 RX ORDER — SODIUM CHLORIDE 9 MG/ML
INJECTION, SOLUTION INTRAVENOUS CONTINUOUS
Status: DISCONTINUED | OUTPATIENT
Start: 2018-07-21 | End: 2018-07-22

## 2018-07-21 RX ORDER — ONDANSETRON 2 MG/ML
4 INJECTION INTRAMUSCULAR; INTRAVENOUS EVERY 6 HOURS PRN
Status: DISCONTINUED | OUTPATIENT
Start: 2018-07-21 | End: 2018-07-21

## 2018-07-21 RX ORDER — ONDANSETRON 4 MG/1
4 TABLET, ORALLY DISINTEGRATING ORAL EVERY 6 HOURS PRN
Status: DISCONTINUED | OUTPATIENT
Start: 2018-07-21 | End: 2018-07-21

## 2018-07-21 RX ORDER — LIDOCAINE HYDROCHLORIDE 10 MG/ML
INJECTION, SOLUTION INFILTRATION; PERINEURAL
Status: DISCONTINUED
Start: 2018-07-21 | End: 2018-07-21 | Stop reason: HOSPADM

## 2018-07-21 RX ORDER — FENTANYL CITRATE 50 UG/ML
25-50 INJECTION, SOLUTION INTRAMUSCULAR; INTRAVENOUS EVERY 5 MIN PRN
Status: DISCONTINUED | OUTPATIENT
Start: 2018-07-21 | End: 2018-07-21

## 2018-07-21 RX ORDER — PROCHLORPERAZINE 25 MG
12.5 SUPPOSITORY, RECTAL RECTAL EVERY 12 HOURS PRN
Status: DISCONTINUED | OUTPATIENT
Start: 2018-07-21 | End: 2018-07-25 | Stop reason: HOSPADM

## 2018-07-21 RX ORDER — PROCHLORPERAZINE MALEATE 5 MG
5 TABLET ORAL EVERY 6 HOURS PRN
Status: DISCONTINUED | OUTPATIENT
Start: 2018-07-21 | End: 2018-07-25 | Stop reason: HOSPADM

## 2018-07-21 RX ORDER — ONDANSETRON 2 MG/ML
4 INJECTION INTRAMUSCULAR; INTRAVENOUS EVERY 6 HOURS PRN
Status: DISCONTINUED | OUTPATIENT
Start: 2018-07-21 | End: 2018-07-22

## 2018-07-21 RX ORDER — LORAZEPAM 2 MG/ML
INJECTION INTRAMUSCULAR
Status: COMPLETED
Start: 2018-07-21 | End: 2018-07-21

## 2018-07-21 RX ORDER — LORAZEPAM 2 MG/ML
0.5 INJECTION INTRAMUSCULAR ONCE
Status: COMPLETED | OUTPATIENT
Start: 2018-07-21 | End: 2018-07-21

## 2018-07-21 RX ADMIN — IOPAMIDOL 120 ML: 755 INJECTION, SOLUTION INTRAVENOUS at 18:31

## 2018-07-21 RX ADMIN — LORAZEPAM 0.5 MG: 2 INJECTION INTRAMUSCULAR at 18:20

## 2018-07-21 RX ADMIN — FENTANYL CITRATE 25 MCG: 50 INJECTION INTRAMUSCULAR; INTRAVENOUS at 20:47

## 2018-07-21 RX ADMIN — MIDAZOLAM HYDROCHLORIDE 0.5 MG: 1 INJECTION, SOLUTION INTRAMUSCULAR; INTRAVENOUS at 20:47

## 2018-07-21 RX ADMIN — SODIUM CHLORIDE 2.5 MG/HR: 900 INJECTION, SOLUTION INTRAVENOUS at 22:29

## 2018-07-21 RX ADMIN — MIDAZOLAM HYDROCHLORIDE 0.5 MG: 1 INJECTION, SOLUTION INTRAMUSCULAR; INTRAVENOUS at 20:10

## 2018-07-21 RX ADMIN — ALTEPLASE 6.52 MG: KIT at 19:02

## 2018-07-21 RX ADMIN — FENTANYL CITRATE 50 MCG: 50 INJECTION INTRAMUSCULAR; INTRAVENOUS at 20:04

## 2018-07-21 RX ADMIN — HEPARIN SODIUM 10000 UNITS: 10000 INJECTION, SOLUTION INTRAVENOUS; SUBCUTANEOUS at 20:15

## 2018-07-21 RX ADMIN — FENTANYL CITRATE 25 MCG: 50 INJECTION INTRAMUSCULAR; INTRAVENOUS at 20:40

## 2018-07-21 RX ADMIN — ALTEPLASE 58.64 MG: KIT at 19:03

## 2018-07-21 RX ADMIN — IOPAMIDOL 40 ML: 612 INJECTION, SOLUTION INTRAVENOUS at 21:41

## 2018-07-21 RX ADMIN — MIDAZOLAM HYDROCHLORIDE 1 MG: 1 INJECTION, SOLUTION INTRAMUSCULAR; INTRAVENOUS at 20:04

## 2018-07-21 RX ADMIN — SODIUM CHLORIDE 100 ML: 900 INJECTION, SOLUTION INTRAVENOUS at 18:32

## 2018-07-21 RX ADMIN — LIDOCAINE HYDROCHLORIDE 7 ML: 10 INJECTION, SOLUTION INFILTRATION; PERINEURAL at 20:29

## 2018-07-21 RX ADMIN — MIDAZOLAM HYDROCHLORIDE 0.5 MG: 1 INJECTION, SOLUTION INTRAMUSCULAR; INTRAVENOUS at 20:40

## 2018-07-21 RX ADMIN — FENTANYL CITRATE 25 MCG: 50 INJECTION INTRAMUSCULAR; INTRAVENOUS at 20:10

## 2018-07-21 RX ADMIN — HEPARIN SODIUM 10000 UNITS: 10000 INJECTION, SOLUTION INTRAVENOUS; SUBCUTANEOUS at 20:18

## 2018-07-21 RX ADMIN — LORAZEPAM 0.5 MG: 2 INJECTION INTRAMUSCULAR; INTRAVENOUS at 18:20

## 2018-07-21 ASSESSMENT — VISUAL ACUITY
OU: NOT TESTABLE

## 2018-07-21 NOTE — ED NOTES
Bed: ST02  Expected date:   Expected time:   Means of arrival:   Comments:  535  87 M altered/active stroke alert  1805

## 2018-07-21 NOTE — IP AVS SNAPSHOT
[address] [facility]    [address] [place] [address]    Phone:  [phone]                                       After Visit Summary   7/21/2018    Delta Otoole    MRN: [id number]           After Visit Summary Signature Page     I have received my discharge instructions, and my questions have been answered. I have discussed any challenges I see with this plan with the nurse or doctor.    ..........................................................................................................................................  Patient/Patient Representative Signature      ..........................................................................................................................................  Patient Representative Print Name and Relationship to Patient    ..................................................               ................................................  Date                                            Time    ..........................................................................................................................................  Reviewed by Signature/Title    ...................................................              ..............................................  Date                                                            Time

## 2018-07-21 NOTE — IP AVS SNAPSHOT
MRN:[id number]                      After Visit Summary   7/21/2018    Delta Otoole    MRN: [id number]           Thank you!     Thank you for choosing [facility] for your care. Our goal is always to provide you with excellent care. Hearing back from our patients is one way we can continue to improve our services. Please take a few minutes to complete the written survey that you may receive in the mail after you visit with us. Thank you!        Patient Information     Date Of Birth          10/10/1931        Designated Caregiver       Most Recent Value    Caregiver    Will someone help with your care after discharge? yes    Name of designated caregiver veena    Phone number of caregiver [phone]    Caregiver address [address] [place]      About your hospital stay     You were admitted on:  July 21, 2018 You last received care in the:  [address] [facility]    You were discharged on:  July 25, 2018        Reason for your hospital stay       You were admitted for stroke which resulted in bleeding in your brain.                  Who to Call     For medical emergencies, please call 911.  For non-urgent questions about your medical care, please call your primary care provider or clinic, [phone]          Attending Provider     Provider Specialty    Chuy Montero MD Emergency Medicine    [facility],  HOSPITALIST       Primary Care Provider Office Phone # Fax #    Bony Castro [phone]      After Care Instructions     Activity       Your activity upon discharge: activity as tolerated            Diet       Follow this diet upon discharge:  Food and fluid for comfort            Discharge Instructions       Call Dr. Morales at Pager [phone] if questions, or Cell Phone [phone].                  Follow-up Appointments     Follow-up and recommended labs and tests        Follow up with hospice care.                 "  Additional Services     Home care nursing referral       RN extended hours visit. RN to assess assist with hospice care.    Your provider has ordered home care nursing services. If you have not been contacted within 2 days of your discharge please call the inpatient department phone number at [phone] .                  Pending Results     Date and Time Order Name Status Description    2018 0420 ABO/Rh type and screen In process             Statement of Approval     Ordered          18 0914  I have reviewed and agree with all the recommendations and orders detailed in this document.  EFFECTIVE NOW     Approved and electronically signed by:  Aquiles Morales MD             Admission Information     Date & Time Provider Department Dept. Phone    [address],  [place] [address] [facility] [phone]      Your Vitals Were     Blood Pressure Pulse Temperature Respirations Height Weight    130/81 (BP Location: Right arm) 66 102.5  F (39.2  C) (Axillary) 32 1.803 m (5' 11\") 73.6 kg (162 lb 4.1 oz)    Pulse Oximetry BMI (Body Mass Index)                91% 22.63 kg/m2          [employer] Information     [employer] lets you send messages to your doctor, view your test results, renew your prescriptions, schedule appointments and more. To sign up, go to [email or web address] . Click on \"Log in\" on the left side of the screen, which will take you to the Welcome page. Then click on \"Sign up Now\" on the right side of the page.     You will be asked to enter the access code listed below, as well as some personal information. Please follow the directions to create your username and password.     Your access code is: [email or web address]  Expires: 10/23/2018  1:57 PM     Your access code will  in 90 days. If you need help or a new code, please call your [facility] or [phone].        Care EveryWhere ID     This is your Care EveryWhere ID. This could be used by " other organizations to access your [employer] medical records  [id number]        Equal Access to Services     CAESAR ADAM : Jessie Delgadillo, alejandro ortega, alexandr williammamina melchor, bin rivera. [facility] [phone].    ATENCIÓN: Si habla español, tiene a welsh disposición servicios gratuitos de asistencia lingüística. Llame al [phone].    We comply with applicable federal civil rights laws and [place] laws. We do not discriminate on the basis of race, color, national origin, age, disability, sex, sexual orientation, or gender identity.               Review of your medicines      START taking        Dose / Directions    acetaminophen 650 MG Suppository   Commonly known as:  TYLENOL   Used for:  End of life care        Dose:  650 mg   Place 1 suppository (650 mg) rectally every 4 hours as needed for fever or mild pain   Quantity:  2 suppository   Refills:  0       atropine 1 % ophthalmic solution   Used for:  End of life care        Dose:  2-4 drop   Place 2-4 drops under the tongue every 2 hours as needed for other (secretions)   Quantity:  5 mL   Refills:  0       bisacodyl 10 MG Suppository   Commonly known as:  DULCOLAX   Used for:  End of life care        Dose:  10 mg   Place 1 suppository (10 mg) rectally daily as needed for constipation   Quantity:  2 suppository   Refills:  0       haloperidol 2 MG/ML (HIGH CONC) solution   Commonly known as:  HALDOL   Used for:  End of life care        Dose:  0.5-1 mg   Place 0.25-0.5 mLs (0.5-1 mg) under the tongue every 6 hours as needed for agitation or other (nausea)   Quantity:  15 mL   Refills:  0       LORazepam 2 MG/ML (HIGH CONC) solution   Commonly known as:  LORazepam INTENSOL   Used for:  End of life care        Dose:  0.25-0.5 mg   Place 0.13-0.25 mLs (0.26-0.5 mg) under the tongue every 4 hours as needed for anxiety or other (restlessness)   Quantity:  30 mL   Refills:  0       morphine sulfate HIGH  CONCENTRATE 10 mg/0.5 mL (HIGH CONC) solution   Commonly known as:  ROXANOL   Used for:  End of life care        Dose:  5-10 mg   Place 0.25-0.5 mLs (5-10 mg) under the tongue every 2 hours as needed for moderate to severe pain or other (or dyspnea)   Quantity:  30 mL   Refills:  0         STOP taking     aspirin 81 MG tablet           ATORVASTATIN CALCIUM PO           DONEPEZIL HCL PO           ICAPS AREDS 2 PO           polyethylene glycol Packet   Commonly known as:  MIRALAX/GLYCOLAX           VITAMIN D (CHOLECALCIFEROL) PO                Where to get your medicines      Some of these will need a paper prescription and others can be bought over the counter. Ask your nurse if you have questions.     Bring a paper prescription for each of these medications     acetaminophen 650 MG Suppository    atropine 1 % ophthalmic solution    bisacodyl 10 MG Suppository    haloperidol 2 MG/ML (HIGH CONC) solution    LORazepam 2 MG/ML (HIGH CONC) solution    morphine sulfate HIGH CONCENTRATE 10 mg/0.5 mL (HIGH CONC) solution                Protect others around you: Learn how to safely use, store and throw away your medicines at www.disposemymeds.org.        Information about OPIOIDS     PRESCRIPTION OPIOIDS: WHAT YOU NEED TO KNOW   We gave you an opioid (narcotic) pain medicine. It is important to manage your pain, but opioids are not always the best choice. You should first try all the other options your care team gave you. Take this medicine for as short a time (and as few doses) as possible.     These medicines have risks:    DO NOT drive when on new or higher doses of pain medicine. These medicines can affect your alertness and reaction times, and you could be arrested for driving under the influence (DUI). If you need to use opioids long-term, talk to your care team about driving.    DO NOT operate heave machinery    DO NOT do any other dangerous activities while taking these medicines.     DO NOT drink any alcohol while  taking these medicines.      If the opioid prescribed includes acetaminophen, DO NOT take with any other medicines that contain acetaminophen. Read all labels carefully. Look for the word  acetaminophen  or  Tylenol.  Ask your pharmacist if you have questions or are unsure.    You can get addicted to pain medicines, especially if you have a history of addiction (chemical, alcohol or substance dependence). Talk to your care team about ways to reduce this risk.    Store your pills in a secure place, locked if possible. We will not replace any lost or stolen medicine. If you don t finish your medicine, please throw away (dispose) as directed by your pharmacist. The [place] Pollution Control Agency has more information about safe disposal: https://www.pca.[facility].[place].us/living-green/managing-unwanted-medications.     All opioids tend to cause constipation. Drink plenty of water and eat foods that have a lot of fiber, such as fruits, vegetables, prune juice, apple juice and high-fiber cereal. Take a laxative (Miralax, milk of magnesia, Colace, Senna) if you don t move your bowels at least every other day.              Medication List: This is a list of all your medications and when to take them. Check marks below indicate your daily home schedule. Keep this list as a reference.      Medications           Morning Afternoon Evening Bedtime As Needed    acetaminophen 650 MG Suppository   Commonly known as:  TYLENOL   Place 1 suppository (650 mg) rectally every 4 hours as needed for fever or mild pain                                atropine 1 % ophthalmic solution   Place 2-4 drops under the tongue every 2 hours as needed for other (secretions)   Last time this was given:  2 drops on 7/24/2018  8:12 AM                                bisacodyl 10 MG Suppository   Commonly known as:  DULCOLAX   Place 1 suppository (10 mg) rectally daily as needed for constipation                                haloperidol 2 MG/ML (HIGH CONC)  solution   Commonly known as:  HALDOL   Place 0.25-0.5 mLs (0.5-1 mg) under the tongue every 6 hours as needed for agitation or other (nausea)                                LORazepam 2 MG/ML (HIGH CONC) solution   Commonly known as:  LORazepam INTENSOL   Place 0.13-0.25 mLs (0.26-0.5 mg) under the tongue every 4 hours as needed for anxiety or other (restlessness)                                morphine sulfate HIGH CONCENTRATE 10 mg/0.5 mL (HIGH CONC) solution   Commonly known as:  ROXANOL   Place 0.25-0.5 mLs (5-10 mg) under the tongue every 2 hours as needed for moderate to severe pain or other (or dyspnea)   Last time this was given:  10 mg on 7/25/2018  3:35 PM

## 2018-07-21 NOTE — ED PROVIDER NOTES
History     Chief Complaint:  Extremity Weakness    History limited secondary to altered mental status and language barrier. History obtained from EMS.    HPI   Delta Otoole is a 86 year old male with a history of cerebral infarction who presents to the emergency department today via EMS for evaluation of extremity weakness. EMS reports that the patient's wife called around 1700 due to some left sided weakness, facial droop, paralysis, diaphoresis, and weakness that EMS states has significantly worsened while on the way to the emergency department. They also report that he has been trying to fight with his right hand. They say that 30 minutes ago (1740), the patient could move his left and right side equally but that has since changed and he can no longer move his left arm. They report that his previous stroke just came with some memory loss. EMS reports his blood sugar was 116 and his heart rate would fluctuate from 100 to 40 and back to 100. They report his oxygen saturation has been staying around  and his blood pressure was 150's/90's. Of note, the patient is DNR/DNI and arrives with a POLST documenting this.    Allergies:  No Known Drug Allergies     Medications:    Aspirin  Aricept   Lipitor     Past Medical History:    Ankle fracture   Atrial fibrillation with RVR   BPH (benign prostatic hyperplasia)   Cerebral infarction    Dyspnea on exertion   Hearing impairment   Heart murmur   Osteoporosis   Patellar fracture   Pericardial effusion   Pneumonia   PVC (premature ventricular contraction)   Renal disease   Urinary retention   Urine retention     Past Surgical History:    Appendectomy  Cataract IOL, RT/LT  Cystoscopy, transurethral resection prostate, combined  Inguinal hernia repair  Orthopedic surgery on ankle     Family History:    Mother: Respiratory     Social History:  The patient was accompanied to the ED by family.  Smoking Status: Never Smoker  Smokeless Tobacco: Never Used  Alcohol Use:  "Negative  Marital Status:       Review of Systems   Unable to perform ROS: Patient nonverbal     Physical Exam     Patient Vitals for the past 24 hrs:   BP Temp Temp src Pulse Heart Rate Resp SpO2 Height Weight   07/21/18 2125 - - - - 70 13 100 % - -   07/21/18 2120 103/79 - - - 75 14 99 % - -   07/21/18 2115 100/80 - - - 77 15 100 % - -   07/21/18 2110 104/83 - - - 78 16 99 % - -   07/21/18 2105 118/76 - - - 82 14 100 % - -   07/21/18 2100 115/85 - - - 88 11 100 % - -   07/21/18 2055 107/78 - - - 84 13 (!) 89 % - -   07/21/18 2050 96/72 - - - 82 12 91 % - -   07/21/18 2045 109/74 - - - 92 11 94 % - -   07/21/18 2040 (!) 122/96 - - - 100 14 98 % - -   07/21/18 2035 116/86 - - - 82 13 94 % - -   07/21/18 2030 117/88 - - - 93 12 93 % - -   07/21/18 2025 114/85 - - - 95 13 94 % - -   07/21/18 2020 114/80 - - - 89 14 93 % - -   07/21/18 2015 123/88 - - - 87 14 91 % - -   07/21/18 2010 114/79 - - - 93 12 94 % - -   07/21/18 2005 108/90 - - - 98 12 90 % - -   07/21/18 2000 116/76 - - - 102 12 90 % - -   07/21/18 1930 (!) 131/114 - - - 85 16 100 % - -   07/21/18 1915 (!) 155/123 - - - 85 13 100 % - -   07/21/18 1910 - - - - 80 18 100 % - -   07/21/18 1900 (!) 134/112 - - - 89 25 - - -   07/21/18 1845 (!) 130/101 - - - 92 15 100 % - -   07/21/18 1813 (!) 144/97 98.7  F (37.1  C) Temporal 93 - 20 95 % 1.803 m (5' 11\") 72.4 kg (159 lb 11.2 oz)     Physical Exam  General: Nontoxic. Patient nonverbal. Leaning to left side.   Head:  Scalp, face, and head appear normal, atraumatic   Eyes:  Pupils are equal, round, and reactive to light    Conjunctivae non-injected and sclerae white  ENT:    The external nose is normal    Pinnae are normal    The oropharynx is normal, mucous membranes dry    Uvula is in the midline  Neck:  Trachea is in the midline  CV:  Regular rate and rhythm     Normal S1/S2, no S3/S4    No murmur or rub. Radial pulses 2+ bilaterally   Resp:  Lungs are clear and equal bilaterally    There is no " tachypnea    No increased work of breathing    No rales, wheezing, or rhonchi  GI:  Abdomen is soft, no rigidity or guarding    No distension, or mass    No tenderness or rebound tenderness   MS:  Normal muscular tone    Symmetric motor strength    No lower extremity edema. No external evidence of trauma.  Skin:  No rash or acute skin lesions noted  Neuro: Patient with eyes closed. Nonverbal.    Facial weakness on left side.    No movement or response to tactile stimuli in left upper extremity. Patient withdraws to pain in left lower limb. Up going toes in left foot.     Strength 5/5 right upper or lower extremity.     No tremor.    Psych:  Unable to assess.    Emergency Department Course     ECG:  ECG taken at 1852, ECG read at 1857  Atrial fibrillation  Abnormal ECG  Rate 84 bpm. MS interval * ms. QRS duration 96 ms. QT/QTc 388/458 ms. P-R-T axes * -1 -20.    Imaging:  Radiology findings were communicated with the patient who voiced understanding of the findings.    CT Head w/o Contrast  Interval development of left thalamic and left parietal  parenchymal hemorrhages and intraventricular and subarachnoid  hemorrhages.   LEON FLORES MD  Reading per radiology    CTA Head Neck with Contrast  1. Abnormal right M2 segments with occlusion of one of the M2 branches  and near occlusion of the second branch. This M2 abnormality called to  the emergency room at 1844 on 7/21/2018.  2. Mild atherosclerotic disease involving the carotid bifurcations  without stenosis.   LEON FLORES MD  Reading per radiology    CT Head w Contrast  1. Large posterior right middle cerebral artery territory perfusion  abnormality which is primarily infarct but there may be some  peripheral penumbra.  2. Old left frontal and right occipital infarcts.   LEON FLORES MD  Reading per radiology    CT Head w/o Contrast  Chronic changes. No evidence for intracranial hemorrhage  or any acute process.  LEON FLORES MD  Reading per radiology      Laboratory:  Laboratory findings were communicated with the patient who voiced understanding of the findings.    Glucose by meter (Collected 1818): 88  CBC: WBC 5.7, HGB 14.2,   BMP: WNL Creatinine 1.12)  INR: 1.14  Partial thromboplastin time: 26  ABO and Rh: ABO B Rh Pos    Interventions:  1820 Ativan 0.5 mg IV  1902 Activase 6.52 mg IV  1903 Activase 58.64 mg IV    Emergency Department Course:    1809 I performed an exam of the patient as documented above.     1810 IV was inserted and blood was drawn for laboratory testing, results above.    1812 Code stroke.     1814 Nasal cannula placed.     1817 0.5 mg of Ativan ordered.      1820 Nursing notes and vitals reviewed.    1824 The patient was sent for a CT while in the emergency department, results above.     1839 Dr. Croft is performing an evaluation over the telestroke. We will pursue TPA and intraarterial clot retrieval.    1852 EKG performed as noted above.     1920 Recheck.     1925 I spoke with Dr. Pruett of the hospitalist service from [facility] regarding patient's presentation, findings, and plan of care.    2000 The patient was sent for a angiogram while in the emergency department, results above.     2152 The patient was sent for a CT while in the emergency department, results above.     2159 I personally reviewed the imaging and lab results with the patient and answered all related questions prior to admit.     Impression & Plan      Medical Decision Making:  Delta Otoole is a 86 year old gentleman history of prior stroke, afib, not currently on anticoagulation presents via EMS for sudden onset of left sided weakness and altered mental status beginning at 1700. On my initial evaluation patient had dense hemiplegia of the left side with extinction on the right and deviated gaze to the left. He had normal strength on the right side. Initial history was difficult to obtain because the patient is Iraqi speaking only and phone   could not be used. Given the concern for acute onset of neurologic symptoms, code stroke was called. CT scan revealed proximal M2 occlusion of the right middle cerebral artery. Pt was evaluated by stroke neurology and the decision was made to give TPA and proceed with arterial clot retrieval by IR. Patient's family arrived and talked with stroke neurology and did provide consent for this. The remainder of his ED workup is otherwise unremarkable. He has no recent trauma or other contraindication for tPA at this time. He remained stable during his entire ED course. TPA was initiated in the ED and the patient was transferred to IR in stable condition.     Critical Care time was 50 minutes for this patient excluding procedures.     Diagnosis:    ICD-10-CM    1. Acute ischemic right MCA stroke (H) I63.511         Disposition:   The patient is admitted into the care of Dr. Pruett.      CMS Diagnoses: The patient has stroke symptoms:           ED Stroke specific documentation           NIHSS PDF          Protocol PDF     Patient last known well time: 1700  ED Provider first to bedside at: 1809  CT Results received at: 1844  Patient was treated with TPA.  The risks and benefits of TPA were reviewed by stroke neurologist.  These risks included bleeding complications such as intracranial bleeding and death. The patient or patient s surrogate s decisional capacity was assessed to be intact. TPA decision was made after this informed consent.    National Institutes of Health Stroke Scale (Baseline)  Time Performed: 1809      Score    Level of consciousness: (1)   Not alert; arousable w/ minor stim to obey/answer/respond    LOC questions: (2)   Answers neither question correctly    LOC commands: (2)   Performs neither task correctly    Best gaze: (2)   Forced deviation    Visual: (0)   No visual loss    Facial palsy: (2)   Partial paralysis (total/near total of lower face)    Motor arm (left): (4)   No movement     Motor arm (right): (0)   No drift    Motor leg (left): (4)   No movement    Motor leg (right): (1)   Drift    Limb ataxia: (0)   Absent    Sensory: (0)   Normal- no sensory loss    Best language: (3)   Mute- global aphasia    Dysarthria: (0)   Normal    Extinction and inattention: (2)   Profound darryl-inattention / extinction > one modality        Total Score:  23      Stroke Mimics were considered (including migraine headache, seizure disorder, hypoglycemia (or hyperglycemia), head or spinal trauma, CNS infection, Toxin ingestion and shock state (e.g. sepsis) .    Scribe Disclosure:  I, Yana Baker, am serving as a scribe at 6:29 PM on 7/21/2018 to document services personally performed by Chuy Montero MD based on my observations and the provider's statements to me.       EMERGENCY DEPARTMENT       Chuy Montero MD  07/22/18 0128

## 2018-07-21 NOTE — CONSULTS
[facility]      Stroke Code Note    Delta Otoole is a 86 year old male with Afib, prior cortical stroke, previously on Xarelto (stopped taking it one year prior) who presents with left-sided weakness, aphasia.  At baseline he lived at home and was indepednent, he had some early memory problems.  Discussed IV tPA and endovascular treatment with English-speaking family members and verbal consent was obtained.  No contraindications to IV tPA.    Head CT shows prior cortical strokes.  CTA showed proximal R. MCA occlusion within the M2 segment.  CTP: Large matched perfusion deficit in the R. Posterior MCA territroy.      A stroke code was activated for emergent evaluation.    Stroke Code Data  Time notified 18:14   Time of first response 18:18   Time tele service began 1844   Time tele service ended 1855   Last known normal 1700   Time of discovery (or onset of symptoms)  1700   Time head CT read by me 1830   Was stroke code de-escalated? No       Telestroke Service Details  Type of service telemedicine diagnostic assessment of acute neurological changes   Reason telemedicine is appropriate patient required immediate assistance from specialist   Mode of transmission secure interactive audio and video communication per Nilesh   Originating site (patient location) [facility]    Distant site (provider location) [facility]       TPA Treatment  Yes    Stroke Scales      National Institutes of Health Stroke Scale (at presentation)   NIHSS done at:  time patient seen      Score    Level of consciousness:  (2)   Not alert; repeat stim to attend strong stim/pain to move    LOC questions:  (2)   Answers neither question correctly    LOC commands:  (1)   Performs one task correctly    Best gaze:  (1)   Partial gaze palsy    Visual:  (0)   No visual loss    Facial palsy:  (2)   Partial paralysis (total/near total of lower face)    Motor arm (left):  (4)   No movement    Motor  arm (right):  (2)   Some effort against gravity    Motor leg (left):  (4)   No movement    Motor leg (right):  (2)   Some effort against gravity    Limb ataxia:  (0)   Absent    Sensory:  (2)   Severe to total sensory loss    Best language:  (3)   Mute- global aphasia    Dysarthria:  (2))      Extinction and inattention:  (2)           NIHSS Total Score:  27        Impression:  1. R. M2 occlusion  2. Prior stroke  3. A fib    Recommendations  1. IV tPA  2. Endovascular treatment  --Matched perfusion deficit concerning for poor collateral blood supply, however, it is too early from onset to assume irreversibly infarction  3. Admit to ICU  4. Post-tPA, post, thrombectomy care  ----post procedure SBP cap < 140 if successful thrombectomy (TICI 2b or TICI 3)    Marv Hollingsworth MD, MS  Vascular Neurology  July 21, 2018    Please contact the stroke service with any questions: link to Text page    I spent 60 minutes of critical care time supervising the patient's code stroke activation.   I personally reviewed all relevant labs and neuroimaging.    ____________________________________________________________________      Past Medical History:   Diagnosis Date     Ankle fracture      Atrial fibrillation with RVR (H)      BPH (benign prostatic hyperplasia)      Cerebral infarction (H) 3/15     Dyspnea on exertion      Hearing impairment      Heart murmur      Osteoporosis      Patellar fracture      Pericardial effusion     Noted in March 2015, resolved on 5/11/15 Echo     Pneumonia      PVC (premature ventricular contraction)      Renal disease     mild renal insufficiency     Urinary retention      Urine retention        No current facility-administered medications for this encounter.     Current Outpatient Prescriptions:      aspirin 81 MG tablet, Take 1 tablet (81 mg) by mouth daily, Disp: 30 tablet, Rfl: 11     Cholecalciferol (VITAMIN D3 PO), Take 50,000 Units by mouth every 7 days , Disp: , Rfl:      polyethylene  glycol (MIRALAX/GLYCOLAX) powder, MIX 17GRAMS IN 8OZ OF WATER & DRINK TWICE A DAY AS NEEDED, Disp: 1 Bottle, Rfl: 1     rivaroxaban ANTICOAGULANT (XARELTO) 15 MG TABS tablet, Take 1 tablet (15 mg) by mouth daily (with dinner), Disp: 90 tablet, Rfl: 3    Social history & family history reviewed, please refer to admission H&P    ROS: completed in detail and negative unless otherwise noted above.    Vital Signs:  B/P: 144/97, T: 98.7, P: 93, R: 20     Neuro:  Limited due to decreased responsiveness, Patient has eyes closed, but is following commands in Pakistani.  Coughing, protecting airway.  See NIHSS above    Labs/Studies:  CBC:     Recent Labs  Lab 07/21/18 1812   WBC 5.7   RBC 4.42   HGB 14.2   HCT 41.4        Basic Metabolic Panel:   Recent Labs   Lab Test  07/21/18   1812 02/04/16 10/22/15  05/18/15   1627  03/19/15   1620   NA  139  139  138  139  135   POTASSIUM  4.6  4.3  4.7  5.0  4.3   CHLORIDE  105  106  107  104  103   CO2  28   --    --   23  27   BUN  26  29  32  27  16   CR  1.12  1.22  1.20  1.16  0.89   GLC  93  99  91  81  88   JALEESA  8.8  9.2  9.1  8.9  8.3*     Liver panel:  No lab results found.  INR:  Recent Labs   Lab Test  07/21/18   1812  03/19/15   1620   INR  1.14  1.20*      Lipid Profile:  Recent Labs   Lab Test 02/04/16  08/13/15   1120  03/20/15   0515   CHOL  153  125  105   HDL  66  88  46   LDL  65  28  49   TRIG  110  46  50   CHOLHDLRATIO   --   1.4  2.3     A1C:   Recent Labs   Lab Test  03/18/15   1703   A1C  5.4     Troponin I:   Recent Labs   Lab Test  03/20/15   0120  03/19/15   2000  03/19/15   1620   TROPI  0.227*  0.242*  0.264*         Imaging:  Relevant findings as per the Impression above.

## 2018-07-22 ENCOUNTER — APPOINTMENT (OUTPATIENT)
Dept: CT IMAGING | Facility: CLINIC | Age: 83
DRG: 023 | End: 2018-07-22
Attending: PSYCHIATRY & NEUROLOGY
Payer: COMMERCIAL

## 2018-07-22 ENCOUNTER — APPOINTMENT (OUTPATIENT)
Dept: INTERVENTIONAL RADIOLOGY/VASCULAR | Facility: CLINIC | Age: 83
DRG: 023 | End: 2018-07-22
Attending: RADIOLOGY
Payer: COMMERCIAL

## 2018-07-22 LAB
ALBUMIN SERPL-MCNC: 2.8 G/DL (ref 3.4–5)
ALP SERPL-CCNC: 62 U/L (ref 40–150)
ALT SERPL W P-5'-P-CCNC: 20 U/L (ref 0–70)
ANION GAP SERPL CALCULATED.3IONS-SCNC: 8 MMOL/L (ref 3–14)
APTT PPP: 28 SEC (ref 22–37)
AST SERPL W P-5'-P-CCNC: 31 U/L (ref 0–45)
BILIRUB DIRECT SERPL-MCNC: 0.2 MG/DL (ref 0–0.2)
BILIRUB SERPL-MCNC: 0.6 MG/DL (ref 0.2–1.3)
BUN SERPL-MCNC: 19 MG/DL (ref 7–30)
CALCIUM SERPL-MCNC: 8.1 MG/DL (ref 8.5–10.1)
CHLORIDE SERPL-SCNC: 106 MMOL/L (ref 94–109)
CHOLEST SERPL-MCNC: 128 MG/DL
CK SERPL-CCNC: 120 U/L (ref 30–300)
CO2 SERPL-SCNC: 24 MMOL/L (ref 20–32)
CREAT SERPL-MCNC: 0.87 MG/DL (ref 0.66–1.25)
ERYTHROCYTE [DISTWIDTH] IN BLOOD BY AUTOMATED COUNT: 12.4 % (ref 10–15)
FIBRINOGEN PPP-MCNC: 298 MG/DL (ref 200–420)
FIBRINOGEN PPP-MCNC: 313 MG/DL (ref 200–420)
GFR SERPL CREATININE-BSD FRML MDRD: 83 ML/MIN/1.7M2
GLUCOSE BLDC GLUCOMTR-MCNC: 121 MG/DL (ref 70–99)
GLUCOSE SERPL-MCNC: 145 MG/DL (ref 70–99)
HBA1C MFR BLD: 5.2 % (ref 0–5.6)
HCT VFR BLD AUTO: 38.7 % (ref 40–53)
HDLC SERPL-MCNC: 56 MG/DL
HGB BLD-MCNC: 13.3 G/DL (ref 13.3–17.7)
INR PPP: 1.37 (ref 0.86–1.14)
LDLC SERPL CALC-MCNC: 62 MG/DL
MAGNESIUM SERPL-MCNC: 1.9 MG/DL (ref 1.6–2.3)
MCH RBC QN AUTO: 31.7 PG (ref 26.5–33)
MCHC RBC AUTO-ENTMCNC: 34.4 G/DL (ref 31.5–36.5)
MCV RBC AUTO: 92 FL (ref 78–100)
NONHDLC SERPL-MCNC: 72 MG/DL
PHOSPHATE SERPL-MCNC: 2.7 MG/DL (ref 2.5–4.5)
PLATELET # BLD AUTO: 239 10E9/L (ref 150–450)
POTASSIUM SERPL-SCNC: 4.2 MMOL/L (ref 3.4–5.3)
PROT SERPL-MCNC: 7.3 G/DL (ref 6.8–8.8)
RBC # BLD AUTO: 4.19 10E12/L (ref 4.4–5.9)
SODIUM SERPL-SCNC: 138 MMOL/L (ref 133–144)
TRIGL SERPL-MCNC: 50 MG/DL
TROPONIN I SERPL-MCNC: <0.015 UG/L (ref 0–0.04)
WBC # BLD AUTO: 11.1 10E9/L (ref 4–11)

## 2018-07-22 PROCEDURE — 00000146 ZZHCL STATISTIC GLUCOSE BY METER IP

## 2018-07-22 PROCEDURE — 80061 LIPID PANEL: CPT | Performed by: PSYCHIATRY & NEUROLOGY

## 2018-07-22 PROCEDURE — 95813 EEG EXTND MNTR 61-119 MIN: CPT

## 2018-07-22 PROCEDURE — 27210906 ZZH KIT CR8

## 2018-07-22 PROCEDURE — 99233 SBSQ HOSP IP/OBS HIGH 50: CPT | Performed by: INTERNAL MEDICINE

## 2018-07-22 PROCEDURE — C1887 CATHETER, GUIDING: HCPCS

## 2018-07-22 PROCEDURE — 85384 FIBRINOGEN ACTIVITY: CPT | Performed by: PSYCHIATRY & NEUROLOGY

## 2018-07-22 PROCEDURE — 86901 BLOOD TYPING SEROLOGIC RH(D): CPT | Performed by: PSYCHIATRY & NEUROLOGY

## 2018-07-22 PROCEDURE — 25000128 H RX IP 250 OP 636: Performed by: HOSPITALIST

## 2018-07-22 PROCEDURE — 27210732 ZZH ACCESSORY CR1

## 2018-07-22 PROCEDURE — C1769 GUIDE WIRE: HCPCS

## 2018-07-22 PROCEDURE — 40000333 IR DISCONTINUE SHEATH

## 2018-07-22 PROCEDURE — 85027 COMPLETE CBC AUTOMATED: CPT | Performed by: PSYCHIATRY & NEUROLOGY

## 2018-07-22 PROCEDURE — 80076 HEPATIC FUNCTION PANEL: CPT | Performed by: PSYCHIATRY & NEUROLOGY

## 2018-07-22 PROCEDURE — 80048 BASIC METABOLIC PNL TOTAL CA: CPT | Performed by: PSYCHIATRY & NEUROLOGY

## 2018-07-22 PROCEDURE — 99291 CRITICAL CARE FIRST HOUR: CPT | Performed by: PSYCHIATRY & NEUROLOGY

## 2018-07-22 PROCEDURE — 27210742 ZZH CATH CR1

## 2018-07-22 PROCEDURE — 25000128 H RX IP 250 OP 636: Performed by: INTERNAL MEDICINE

## 2018-07-22 PROCEDURE — 27211192 ZZ H SHEATH CR14

## 2018-07-22 PROCEDURE — 70450 CT HEAD/BRAIN W/O DYE: CPT

## 2018-07-22 PROCEDURE — 25000125 ZZHC RX 250: Performed by: INTERNAL MEDICINE

## 2018-07-22 PROCEDURE — 99233 SBSQ HOSP IP/OBS HIGH 50: CPT | Performed by: PSYCHIATRY & NEUROLOGY

## 2018-07-22 PROCEDURE — 85730 THROMBOPLASTIN TIME PARTIAL: CPT | Performed by: PSYCHIATRY & NEUROLOGY

## 2018-07-22 PROCEDURE — 82550 ASSAY OF CK (CPK): CPT | Performed by: PSYCHIATRY & NEUROLOGY

## 2018-07-22 PROCEDURE — 84484 ASSAY OF TROPONIN QUANT: CPT | Performed by: PSYCHIATRY & NEUROLOGY

## 2018-07-22 PROCEDURE — 83036 HEMOGLOBIN GLYCOSYLATED A1C: CPT | Performed by: PSYCHIATRY & NEUROLOGY

## 2018-07-22 PROCEDURE — 86900 BLOOD TYPING SEROLOGIC ABO: CPT | Performed by: PSYCHIATRY & NEUROLOGY

## 2018-07-22 PROCEDURE — 84100 ASSAY OF PHOSPHORUS: CPT | Performed by: PSYCHIATRY & NEUROLOGY

## 2018-07-22 PROCEDURE — 25000132 ZZH RX MED GY IP 250 OP 250 PS 637: Performed by: INTERNAL MEDICINE

## 2018-07-22 PROCEDURE — 86850 RBC ANTIBODY SCREEN: CPT | Performed by: PSYCHIATRY & NEUROLOGY

## 2018-07-22 PROCEDURE — 12000007 ZZH R&B INTERMEDIATE

## 2018-07-22 PROCEDURE — 85610 PROTHROMBIN TIME: CPT | Performed by: PSYCHIATRY & NEUROLOGY

## 2018-07-22 PROCEDURE — 99292 CRITICAL CARE ADDL 30 MIN: CPT | Performed by: PSYCHIATRY & NEUROLOGY

## 2018-07-22 PROCEDURE — 83735 ASSAY OF MAGNESIUM: CPT | Performed by: PSYCHIATRY & NEUROLOGY

## 2018-07-22 PROCEDURE — 25000125 ZZHC RX 250: Performed by: HOSPITALIST

## 2018-07-22 RX ORDER — MORPHINE SULFATE 10 MG/5ML
5-10 SOLUTION ORAL
Status: DISCONTINUED | OUTPATIENT
Start: 2018-07-22 | End: 2018-07-25 | Stop reason: HOSPADM

## 2018-07-22 RX ORDER — ATROPINE SULFATE 10 MG/ML
1-2 SOLUTION/ DROPS OPHTHALMIC
Status: DISCONTINUED | OUTPATIENT
Start: 2018-07-22 | End: 2018-07-25 | Stop reason: HOSPADM

## 2018-07-22 RX ORDER — LORAZEPAM 2 MG/ML
.5-1 INJECTION INTRAMUSCULAR
Status: DISCONTINUED | OUTPATIENT
Start: 2018-07-22 | End: 2018-07-25 | Stop reason: HOSPADM

## 2018-07-22 RX ORDER — GLYCOPYRROLATE 1 MG/1
1 TABLET ORAL 3 TIMES DAILY
Status: DISCONTINUED | OUTPATIENT
Start: 2018-07-22 | End: 2018-07-25 | Stop reason: HOSPADM

## 2018-07-22 RX ORDER — MORPHINE SULFATE 2 MG/ML
1-2 INJECTION, SOLUTION INTRAMUSCULAR; INTRAVENOUS
Status: DISCONTINUED | OUTPATIENT
Start: 2018-07-22 | End: 2018-07-25 | Stop reason: HOSPADM

## 2018-07-22 RX ORDER — ONDANSETRON 4 MG/1
4 TABLET, ORALLY DISINTEGRATING ORAL EVERY 6 HOURS PRN
Status: DISCONTINUED | OUTPATIENT
Start: 2018-07-22 | End: 2018-07-22

## 2018-07-22 RX ORDER — SALIVA STIMULANT COMB. NO.3
2 SPRAY, NON-AEROSOL (ML) MUCOUS MEMBRANE
Status: DISCONTINUED | OUTPATIENT
Start: 2018-07-22 | End: 2018-07-25 | Stop reason: HOSPADM

## 2018-07-22 RX ORDER — LORAZEPAM 0.5 MG/1
.5-1 TABLET ORAL
Status: DISCONTINUED | OUTPATIENT
Start: 2018-07-22 | End: 2018-07-25 | Stop reason: HOSPADM

## 2018-07-22 RX ORDER — ONDANSETRON 2 MG/ML
4 INJECTION INTRAMUSCULAR; INTRAVENOUS EVERY 6 HOURS PRN
Status: DISCONTINUED | OUTPATIENT
Start: 2018-07-22 | End: 2018-07-22

## 2018-07-22 RX ORDER — GLYCOPYRROLATE 0.2 MG/ML
.1-.2 INJECTION, SOLUTION INTRAMUSCULAR; INTRAVENOUS EVERY 4 HOURS PRN
Status: DISCONTINUED | OUTPATIENT
Start: 2018-07-22 | End: 2018-07-25 | Stop reason: HOSPADM

## 2018-07-22 RX ORDER — ACETAMINOPHEN 325 MG/1
650 TABLET ORAL EVERY 6 HOURS PRN
Status: DISCONTINUED | OUTPATIENT
Start: 2018-07-22 | End: 2018-07-25 | Stop reason: HOSPADM

## 2018-07-22 RX ORDER — MINERAL OIL/HYDROPHIL PETROLAT
OINTMENT (GRAM) TOPICAL EVERY 8 HOURS PRN
Status: DISCONTINUED | OUTPATIENT
Start: 2018-07-22 | End: 2018-07-25 | Stop reason: HOSPADM

## 2018-07-22 RX ORDER — MORPHINE SULFATE 100 MG/5ML
5-10 SOLUTION ORAL
Status: DISCONTINUED | OUTPATIENT
Start: 2018-07-22 | End: 2018-07-25 | Stop reason: HOSPADM

## 2018-07-22 RX ADMIN — ATROPINE SULFATE 2 DROP: 10 SOLUTION/ DROPS OPHTHALMIC at 23:47

## 2018-07-22 RX ADMIN — ATROPINE SULFATE 2 DROP: 10 SOLUTION/ DROPS OPHTHALMIC at 14:39

## 2018-07-22 RX ADMIN — GLYCOPYRROLATE 0.2 MG: 0.2 INJECTION, SOLUTION INTRAMUSCULAR; INTRAVENOUS at 14:38

## 2018-07-22 RX ADMIN — MORPHINE SULFATE 10 MG: 100 SOLUTION ORAL at 23:47

## 2018-07-22 RX ADMIN — MORPHINE SULFATE 5 MG: 100 SOLUTION ORAL at 21:20

## 2018-07-22 RX ADMIN — ATROPINE SULFATE 2 DROP: 10 SOLUTION/ DROPS OPHTHALMIC at 16:37

## 2018-07-22 RX ADMIN — ATROPINE SULFATE 2 DROP: 10 SOLUTION/ DROPS OPHTHALMIC at 15:26

## 2018-07-22 RX ADMIN — SODIUM CHLORIDE: 9 INJECTION, SOLUTION INTRAVENOUS at 04:37

## 2018-07-22 RX ADMIN — MORPHINE SULFATE 2 MG: 2 INJECTION, SOLUTION INTRAMUSCULAR; INTRAVENOUS at 14:03

## 2018-07-22 RX ADMIN — METOPROLOL TARTRATE 2.5 MG: 5 INJECTION, SOLUTION INTRAVENOUS at 00:52

## 2018-07-22 RX ADMIN — ATROPINE SULFATE 2 DROP: 10 SOLUTION/ DROPS OPHTHALMIC at 19:13

## 2018-07-22 ASSESSMENT — VISUAL ACUITY
OU: NOT TESTABLE

## 2018-07-22 ASSESSMENT — ACTIVITIES OF DAILY LIVING (ADL)
ADLS_ACUITY_SCORE: 12
ADLS_ACUITY_SCORE: 13
ADLS_ACUITY_SCORE: 12

## 2018-07-22 NOTE — PROGRESS NOTES
"Interventional Neuroradiology    S: No verbal    O: BP (!) 130/98  Pulse 93  Temp 99.4  F (37.4  C) (Axillary)  Resp 22  Ht 1.803 m (5' 11\")  Wt 73.6 kg (162 lb 4.1 oz)  SpO2 98%  BMI 22.63 kg/m2    Head CT: IMPRESSION:   1. No change in the parenchymal hemorrhage in the left thalamic region  or the subarachnoid hemorrhage. No change in the hematoma in the left  parietal region.     2. Evolving infarct in the right frontal lobe.  3. Chronic infarcts in the right occipital and left frontal regions    Angio/thrombectomy:IMPRESSION:   Patient underwent endovascular mechanical thrombectomy of a right  middle cerebral artery posterior division M2 trunk occlusion. At the  conclusion of the procedure there has been reperfusion of  approximately 60% of the previously occluded right middle cerebral  artery territory. There is a persistent posterior division M3 branch  occlusion that is too far distal to intervene upon with mechanical  Devices.    Neuro: eyes closed, no verbal response, no follow commands. Withdraws bilateral LE Lt>Rt  Rt femoral sheath in place, site soft    A: Rt MCA stroke  Status post IV thrombolytic and endovascular thrombectomy  Left thalamic hemorrhage    P: Sheath to be removed today.  4 hours of Rt leg straight post hemostasis  We will sign off. Please contact us again if needed [phone]    ROGER Black, CNP           "

## 2018-07-22 NOTE — PLAN OF CARE
Problem: Patient Care Overview  Goal: Plan of Care/Patient Progress Review  Outcome: No Change  Pt obtunded, KEAGAN orientation.  Frequent apneic episodes.  MD aware.  SBP maintained under 120.  A line patent, blood returned noted, dampened.  Posturing on all 4 extremities with any stimulation.  Pupils PERRL, L gaze preference.  Sheath in place, site CDI, soft.  Young patent with adequate UOP.  No BM this shift.  Lung sounds coarse, nasal trumpet in place.  Continue to monitor.

## 2018-07-22 NOTE — PROCEDURES
Interventional Neuroradiology Post Procedure    Patient Name: Delta Otoole  MRN: [id number]  Date of Procedure: July 21, 2018    Procedure: endovascular mechanical thrombectomy of the right middle cerebral artery posterior division M2 trunk  Radiologist: Jose Barnes    Contrast: 40 ml Isovue  Fluoro Time: 31.4 minutes  Air Kerma: 1222 mGy  Medications: Versed 2.5 mg IV                       Fentanyl 125 mcg IV  Sedation Time: 74 minutes    EBL: 100 ml  Complications: none    I determined this patient to be an appropriate candidate for the planned sedation and procedure and reassessed the patient immediately prior to sedation and procedure.    Preliminary Report:   (See dictation for full detail)  Pre: occlusion of the posterior division right mca M2 trunk    Post: The posterior division M2 trunk and anterior 60 % of the previously occluded territory reperfused. Persistent occlusion of the a posterior division branch at the M3 origin - too far distal to intervene upon.    Assess/Plan:  Head CT  ICU care  Sheath removal in am  Report to Dr. Edel Barnes MD  [phone]

## 2018-07-22 NOTE — PLAN OF CARE
Problem: Patient Care Overview  Goal: Plan of Care/Patient Progress Review  OT/PT:Orders received and chart reviewed. Pt. not appropriate for therapy this date. Will check status 7/23.

## 2018-07-22 NOTE — IR NOTE
Interventional Radiology Intra-procedural Nursing Note    Patient Name: Delta Otoole  Medical Record Number: [id number]  Today's Date: July 21, 2018    Start Time: 2003  End of procedure time: 2117  Procedure: cerebral angiogram with mechanical thrombectomy.  Report given to: Joe, ICU RN  Time pt departs:  2141  : lana Frost Notes: right groin 7F sheath sutured into placed, pressure bag connected with NS. CDI, soft. VSS. Pt remains on 6L NC. No c/o pain. Arousal on calling, very lethargic yet from sedation. No further questions from RN. Pt transported via bed with flying squad and RN and transport tech to [facility]

## 2018-07-22 NOTE — PROVIDER NOTIFICATION
Dr. Hollingsworth paged, radiologist requesting contact information to report CT results.  Dr. Hollingsworth returned page, aware of results.  No new orders placed.  Continue to monitor.   SUBJECTIVE:SOURCE OF History:Mom     Sabra Peters is a 43 year old female who presents with dry cough, sore throat, fever, chills, facial pain, headache, myalgias and nausea.  Onset 2 days ago and the symptoms have been sudden and worsening.  She has a history of Brain Tumor.  She is a non-smoker.  Sabra denies having vomiting/diarrhea.     She is on Tegretol since 6/18/2018.    Nurse's notes reviewed and agree.    Past Medical History:   Diagnosis Date   • Acid reflux 2012   • Arthritis 2012   • Chronic allergic rhinitis    • Chronic daily headache    • CTS (carpal tunnel syndrome)    • Esophagitis 7/2/2015   • Essential hypertension, benign    • Fibromyalgia    • GERD (gastroesophageal reflux disease) 7/2/2015   • High cholesterol 2012   • Knee pain    • Malaise and fatigue    • Migraine 2012   • Obesity    • Peripheral neuropathy    • Plantar fasciitis    • Special screening for malignant neoplasms, colon 7/2/2015    normal   • Spinal stenosis          OBJECTIVE:    Visit Vitals  /72 (BP Location: [facility] [place], Patient Position: Sitting, Cuff Size: Large Adult)   Pulse 130   Temp 101.4 °F (38.6 °C) (Oral)   Resp 16   LMP 03/21/2018   SpO2 96%       General appearance:  Healthy, alert, in no distress  SKIN:  Skin color, texture, turgor are normal. There are no bruises, rashes or lesions.  Nose:  mucosal edema  Ears:  External ears normal. Canals clear. Tympanic membranes are clear with all landmarks noted.  Throat:  severe erythema noted and no exudates.  Neck:  supple, no lymphadenopathy.  Lungs:  clear to auscultation.  Cards:  regular rate and rhythm,     ASSESSMENT:(R50.9) Fever and chills  (primary encounter diagnosis)    Plan: CBC & AUTO DIFFERENTIAL, BASIC METABOLIC PANEL,        URINALYSIS & REFLEX MICRO WITH CULTURE IF   INDICATED:discussed.            (J02.9) Acute pharyngitis, unspecified etiology       Medication(s) see below orders.   Tylenol prn fever or headache.saline Gargles and plenty of  Fluids.

## 2018-07-22 NOTE — PROGRESS NOTES
Brief Progress:    Spoke with IR, some success in opening proximal M2 occlusion.  Follow-up Head CT shows PH2 hemorrhagic transformation with blood/contrast in the contralateral basal ganglia, small area of sulcal SAH, and blood within the left lateral ventricle.      Plan:  Fibrinogen, INR/PTT, type/screen sent  --repeat fibrinogen level 1 hour post-cryoprecipitate  10 units emergent cryoprecipitate ordered stat from blood bank.  SBP cap decreased to 120  Repeat HCT in 4 hours  Patient is DNR/DNI    Marv Hollingsworth MD, MS  Neurology    Please contact the stroke service with any questions: link to Text page

## 2018-07-22 NOTE — PHARMACY-ADMISSION MEDICATION HISTORY
Admission medication history interview status for the 7/21/2018  admission is complete. See EPIC admission navigator for prior to admission medications     Medication history source reliability:Good- family, [facility]    Actions taken by pharmacist (provider contacted, etc): Called [facility] to verify last fill of meds - all medications listed with exception of ARED-2 were filled 7/5/18, AREDS-2 last filled 11/2017 but he may be purchasing on own as family indicated he is still taking.     Additional medication history information not noted on PTA med list : Medications listed are current per family & [facility] but not able to verify exact timing of last doses.  -Family confirmed he has not taken Xarelto for a while, [facility] had Rx profiled 3/2017 but not filled.    Medication reconciliation/reorder completed by provider prior to medication history? Yes    Time spent in this activity: 15 min    Prior to Admission medications    Medication Sig Last Dose Taking? Auth Provider   aspirin 81 MG tablet Take 1 tablet (81 mg) by mouth daily Unknown Yes Marialuisa Santiago, APRN CNP   ATORVASTATIN CALCIUM PO Take 40 mg by mouth daily Unknown Yes Unknown, Entered By History   DONEPEZIL HCL PO Take 10 mg by mouth At Bedtime Unknown Yes Unknown, Entered By History   Multiple Vitamins-Minerals (ICAPS AREDS 2 PO) Take 1 capsule by mouth 2 times daily Unknown Yes Unknown, Entered By History   polyethylene glycol (MIRALAX/GLYCOLAX) Packet Take 17 g by mouth daily as needed for constipation Unknown Yes Unknown, Entered By History   VITAMIN D, CHOLECALCIFEROL, PO Take 2,000 Units by mouth daily Unknown Yes Unknown, Entered By History

## 2018-07-22 NOTE — H&P
[facility]    History and Physical  Hospitalist       Date of Admission:  7/21/2018    Assessment & Plan   Delta Otoole is a 86 year old male who presents with stroke    Large posterior RCA stroke  Patient with proximal R MCA occlusion with in the M2 sepgement with large matched pefusion deficit in the posterior MCA territory  Stroke code activated, now s/p TPA per neurology  Plan is for IR for endovascular treatment  Plan  - s/p tPA, orderset initiated with usual precautions  - nicardipine for bp control, (may need update to SBP cap to < 140 pending successful thrombectomy (TICI 2b or TICI 3) per neurology recommendations, ordered typical dosing for now  - ICU  - family asserts DNR, DNI  - aspiration precautions  - needs a formal medical reconciliation completed    Atrial fibrillation with controlled ventricular response  Sick Sinus syndrome  - not on anticoagulation, cardiologist was urging him to restart xarelto per chart review  - can consider during admission pending process  - continue telemetry  - daily labs and electrolytes  - hold metoprolol given sick sinus unless develops RVR  - await medication reconciliation  - will not order echocardiogram, not clear how it would affect management    Memory difficulties ? Early dementia  - patient is likely taking donepazil, await med rec     HLD  - hold the statin        DVT Prophylaxis: Pneumatic Compression Devices  Code Status: DNR / DNI    Disposition: Expected discharge in TBD.    Mike Pruett DO    Primary Care Physician   Bony Castro    Chief Complaint   confusion    History is obtained from the patient    History of Present Illness   Delta Otoole is a 86 year old male who presents with acute onset mental health change that occurred approximately at 1700. Per wife, who the patient lives with, discussed how he just suddenly stopped talking, started having weakness in the left side, facial droop. His health has otherwise  been well; he just walked around [place] this AM with a walker. He has history of old stroke on the right side as well, and reportedly has some memory issues since.     Code status: DNR/DNI, reportedly on some paperwork that is not available at this time. Discussed with the wife and rest of family with the Citizen of the [place]  present and they concur.    Past Medical History    I have reviewed this patient's medical history and updated it with pertinent information if needed.   Past Medical History:   Diagnosis Date     Ankle fracture      Atrial fibrillation with RVR (H)      BPH (benign prostatic hyperplasia)      Cerebral infarction (H) 3/15     Dyspnea on exertion      Hearing impairment      Heart murmur      Osteoporosis      Patellar fracture      Pericardial effusion     Noted in March 2015, resolved on 5/11/15 Echo     Pneumonia      PVC (premature ventricular contraction)      Renal disease     mild renal insufficiency     Urinary retention      Urine retention        Past Surgical History   I have reviewed this patient's surgical history and updated it with pertinent information if needed.  Past Surgical History:   Procedure Laterality Date     APPENDECTOMY       CATARACT IOL, RT/LT       CYSTOSCOPY, TRANSURETHRAL RESECTION (TUR) PROSTATE, COMBINED  11/6/2012    Procedure: COMBINED CYSTOSCOPY, TRANSURETHRAL RESECTION (TUR) PROSTATE;  TRANSURETHRAL RESECTION (TUR) PROSTATE ;  Surgeon: Pasha Jones MD;  Location: [facility] OR     HERNIA REPAIR      inguinal     ORTHOPEDIC SURGERY      ORIF ankle       Prior to Admission Medications   Prior to Admission Medications   Prescriptions Last Dose Informant Patient Reported? Taking?   Cholecalciferol (VITAMIN D3 PO)  Spouse/Significant Other Yes No   Sig: Take 50,000 Units by mouth every 7 days    aspirin 81 MG tablet   No No   Sig: Take 1 tablet (81 mg) by mouth daily   polyethylene glycol (MIRALAX/GLYCOLAX) powder   No No   Sig: MIX 17GRAMS IN 8OZ OF  WATER & DRINK TWICE A DAY AS NEEDED   rivaroxaban ANTICOAGULANT (XARELTO) 15 MG TABS tablet   No No   Sig: Take 1 tablet (15 mg) by mouth daily (with dinner)      Facility-Administered Medications: None     Allergies   Allergies   Allergen Reactions     No Clinical Screening - See Comments      ekg patches= rash       Social History   I have reviewed this patient's social history and updated it with pertinent information if needed. Delta Otoole  reports that he has never smoked. He has never used smokeless tobacco. He reports that he does not drink alcohol or use illicit drugs.    Family History   I have reviewed this patient's family history and updated it with pertinent information if needed.   Family History   Problem Relation Age of Onset     Respiratory Mother        Review of Systems   The 10 point Review of Systems is negative other than noted in the HPI or here.     Physical Exam   Temp: 98.7  F (37.1  C) Temp src: Temporal BP: (!) 134/112 Pulse: 93 Heart Rate: 80 Resp: 18 SpO2: 100 % O2 Device: Nasal cannula Oxygen Delivery: 6 LPM  Vital Signs with Ranges  Temp:  [98.7  F (37.1  C)] 98.7  F (37.1  C)  Pulse:  [93] 93  Heart Rate:  [80-92] 80  Resp:  [15-25] 18  BP: (130-144)/() 134/112  SpO2:  [95 %-100 %] 100 %  159 lbs 11.2 oz    Constitutional: Awake, alert, cooperative, no apparent distress.  Eyes: Conjunctiva and pupils examined and normal.  HEENT: Moist mucous membranes, normal dentition.  Respiratory: Clear to auscultation bilaterally, no crackles or wheezing.  Cardiovascular: Regular rate and rhythm, normal S1 and S2, and no murmur noted.  GI: Soft, non-distended, non-tender, normal bowel sounds.  Lymph/Hematologic: No anterior cervical or supraclavicular adenopathy.  Skin: No rashes, no cyanosis, no edema.  Musculoskeletal: No joint swelling, erythema or tenderness.  Neurologic: moaning, will not follow commands. No visualized moving of the right extremities, but no right sided facial  droop appreciated  Psychiatric: Alert, oriented to person, place and time, no obvious anxiety or depression.    Data   Data reviewed today:  I personally reviewed the EKG tracing showing atrial fibrillation, rate controlled.    Recent Labs  Lab 07/21/18  1812   WBC 5.7   HGB 14.2   MCV 94      INR 1.14      POTASSIUM 4.6   CHLORIDE 105   CO2 28   BUN 26   CR 1.12   ANIONGAP 6   JALEESA 8.8   GLC 93       Imaging:  Recent Results (from the past 24 hour(s))   CT Head w/o Contrast    Narrative    CT SCAN OF THE HEAD WITHOUT CONTRAST   7/21/2018 6:29 PM     HISTORY: Code Stroke.      TECHNIQUE:  Axial images of the head and coronal reformations without  IV contrast material.  Radiation dose for this scan was reduced using  automated exposure control, adjustment of the mA and/or kV according  to patient size, or iterative reconstruction technique.    COMPARISON: None.    FINDINGS: There is a large area of encephalomalacia in the medial  right parietal-occipital lobe most likely due to an old infarct. There  is a small area of encephalomalacia in the left frontal lobe most  likely due to an old infarct. Cerebral atrophy is present. Patchy  white matter changes are seen in both hemispheres without mass effect.  There is no evidence for intracranial hemorrhage, mass effect, acute  infarct, or skull fracture.      Impression    IMPRESSION: Chronic changes. No evidence for intracranial hemorrhage  or any acute process.       LEON FLORES MD   CTA Head Neck with Contrast    Narrative    CTA HEAD NECK WITH CONTRAST 7/21/2018 6:33 PM     HISTORY: Left hemiparesthesias of acute onset.    TECHNIQUE: Axial images were obtained through the head and neck  without and with intravenous contrast. 70 mL of Isovue 370 given.  Multiplanar reconstructions were performed. 3-D reconstructions off a  remote workstation were also acquired. Carotid stenoses were evaluated  by comparing the caliber the proximal internal carotid artery to  the  caliber the internal carotid artery. Radiation dose for this scan was  reduced using automated exposure control, adjustment of the mA and/or  kV according to patient size, or iterative reconstruction technique.     FINDINGS:    Brachiocephalic vessels: Normal.    Right carotid system: Minimal calcific plaque. No stenosis or  dissection.    Left carotid system: Minimal plaque. No evidence for stenosis or  dissection.    Right vertebral artery: Normal nondominant vessel.    Left vertebral artery: Normal.    [place]: There is abrupt occlusion of a right M2 segment  approximately 1.5 cm from its origin. The other right M2 segment also  has markedly diminished flow after 2 cm. The left middle cerebral  artery branches are normal. The distal internal carotid arteries,  basilar artery, and proximal anterior and posterior cerebral arteries  are normal. Incidental note is made of primary origin of left  posterior cerebral artery off the left internal carotid artery. There  is no evidence for aneurysm.    Other findings: Postcontrast images show a paucity of vessels in the  right middle cerebral artery distribution particularly posteriorly.  Some degenerative changes are seen in the spine.      Impression    IMPRESSION:  1. Abnormal right M2 segments with occlusion of one of the M2 branches  and near occlusion of the second branch. This M2 abnormality called to  the emergency room at 1844 on 7/21/2018.  2. Mild atherosclerotic disease involving the carotid bifurcations  without stenosis.    CT Head w Contrast    Narrative    CT HEAD WITH CONTRAST 7/21/2018 6:44 PM     HISTORY: Code Stroke.     TECHNIQUE: Axial images were obtained through the brain with  intravenous contrast. 50 mL of Isovue-370 was given. Radiation dose  for this scan was reduced using automated exposure control, adjustment  of the mA and/or kV according to patient size, or iterative  reconstruction technique.    FINDINGS: There is a large  perfusion abnormality in the posterior  aspect of the right middle cerebral artery territory which is  relatively matched on cerebral blood flow and cerebral blood volume  images but the cerebral blood flow images may be slightly larger. The  perfusion images suggest that this is predominately infarct although  there may be a small amount of penumbra. There is also an old left  frontal infarct and a right occipital infarct.      Impression    IMPRESSION:  1. Large posterior right middle cerebral artery territory perfusion  abnormality which is primarily infarct but there may be some  peripheral penumbra.  2. Old left frontal and right occipital infarcts.

## 2018-07-22 NOTE — PLAN OF CARE
Problem: Patient Care Overview  Goal: Plan of Care/Patient Progress Review  SLP: Bedside swallow eval orders received. Per chart review and discussion with RN, pt currently obtunded with nasal trumpet in place. Pt not appropriate for ST eval. Will follow up tomorrow as appropriate.

## 2018-07-22 NOTE — PLAN OF CARE
Problem: Patient Care Overview  Goal: Plan of Care/Patient Progress Review  Outcome: Declining  Pt unresponsive and obtunded. RUE, RLE, LUE decerebrate posturing. Spontaneous movement in LLE. Pupils 2-3 and sluggish. See neuro assessment.   Afib with rate in the 100's. SBP goal <120.  Irregular breathing pattern and periods of apnea.   Family meeting today and discussed plan of care. Pt made comfort care.   Report called to RN and pt transferred to 505.   Pts daughter Natividad called updated via telephone on room transfer.

## 2018-07-22 NOTE — PROGRESS NOTES
[facility]    Hospitalist Progress Note    Assessment & Plan   Delta Otoole is a 86 year old male who presents with stroke, he went to IR for endovascular mechanical thrombectomy of a right MCA posterior division M2 trunk occlusion. At the  conclusion of the procedure there has been reperfusion ofapproximately 60% of the previously occluded right MCA territory. following procedure head CT showed subarachnoid hemorrhage ,also he had developed a smaller left parietal lobe hematoma . Currently remains unresponsive.had family conference and family decided on comfort measures only .     Large posterior RCA stroke  Atrial fibrillation with controlled ventricular response  Sick Sinus syndrome  Memory difficulties ? Early dementia  HLD  Patient with proximal R MCA occlusion with in the M2 sepgement with large matched pefusion deficit in the posterior MCA territory.  Status post IR consult and catheter directed thrombectomy   Obtunded following complication of subarachnoid hemorrhage following Tissue plasminogen activator   Family decided on comfort measures only  DNR/DNI  No iv fluids .           DVT Prophylaxis: sequential compression device   Code Status: DNR/DNI     Disposition: Expected discharge depends on prognosis and family discussion     Margie Flores MD  Text Page   (7am to 6pm)  Total time  70 min , including time for family conference and decision about comfort measures   Interval History   Overnight events noted, he continue to be unresponsive. Had a long family conference, decided on comfort measures only .    -Data reviewed today: I reviewed all new labs and imaging results over the last 24 hours. I personally reviewed CT head done    Physical Exam   Temp: 99.4  F (37.4  C) Temp src: Axillary BP: 113/85 Pulse: 93 Heart Rate: 106 Resp: 21 SpO2: 96 % O2 Device: Nasal cannula Oxygen Delivery: 3 LPM  Vitals:    07/21/18 1813 07/22/18 0600   Weight: 72.4 kg (159 lb 11.2 oz) 73.6 kg (162 lb  4.1 oz)     Vital Signs with Ranges  Temp:  [95.9  F (35.5  C)-99.4  F (37.4  C)] 99.4  F (37.4  C)  Pulse:  [93] 93  Heart Rate:  [] 106  Resp:  [0-28] 21  BP: ()/() 113/85  MAP:  [63 mmHg-171 mmHg] 98 mmHg  Arterial Line BP: ()/() 100/94  SpO2:  [89 %-100 %] 96 %  I/O last 3 completed shifts:  In: 53.34 [I.V.:53.34]  Out: 2150 [Urine:2150]    Constitutional: unresponsive   Respiratory: Clear to auscultation bilaterally, no crackles or wheezing  Cardiovascular: Regular rate and rhythm, normal S1 and S2, and no murmur noted  GI: Normal bowel sounds, soft, non-distended, non-tender  Skin/Integumen: No rashes, no cyanosis, no edema  Neuro : comatose, some spontaneous movement noted right lower extremity     Medications     - MEDICATION INSTRUCTIONS -       niCARdipine 40 mg in 200 mL 0.9% NaCl Stopped (07/22/18 1045)     - MEDICATION INSTRUCTIONS -       sodium chloride 75 mL/hr at 07/22/18 0437     - MEDICATION INSTRUCTIONS -           Data     Recent Labs  Lab 07/22/18 0430 07/21/18 2211 07/21/18 1812   WBC 11.1*  --  5.7   HGB 13.3  --  14.2   MCV 92  --  94     --  234   INR 1.37* 1.47* 1.14     --  139   POTASSIUM 4.2  --  4.6   CHLORIDE 106  --  105   CO2 24  --  28   BUN 19  --  26   CR 0.87  --  1.12   ANIONGAP 8  --  6   JALEESA 8.1*  --  8.8   *  --  93   ALBUMIN 2.8*  --   --    PROTTOTAL 7.3  --   --    BILITOTAL 0.6  --   --    ALKPHOS 62  --   --    ALT 20  --   --    AST 31  --   --    TROPI <0.015  --   --        Recent Labs  Lab 07/22/18  0846 07/22/18  0430 07/21/18  1818 07/21/18  1812   GLC  --  145*  --  93   *  --  88  --        Imaging:   Recent Results (from the past 24 hour(s))   CT Head w/o Contrast    Narrative    CT SCAN OF THE HEAD WITHOUT CONTRAST   7/21/2018 6:29 PM     HISTORY: Code Stroke.      TECHNIQUE:  Axial images of the head and coronal reformations without  IV contrast material.  Radiation dose for this scan was reduced  using  automated exposure control, adjustment of the mA and/or kV according  to patient size, or iterative reconstruction technique.    COMPARISON: None.    FINDINGS: There is a large area of encephalomalacia in the medial  right parietal-occipital lobe most likely due to an old infarct. There  is a small area of encephalomalacia in the left frontal lobe most  likely due to an old infarct. Cerebral atrophy is present. Patchy  white matter changes are seen in both hemispheres without mass effect.  There is no evidence for intracranial hemorrhage, mass effect, acute  infarct, or skull fracture.      Impression    IMPRESSION: Chronic changes. No evidence for intracranial hemorrhage  or any acute process.       LEON FLORES MD   CTA Head Neck with Contrast    Narrative    CTA HEAD NECK WITH CONTRAST 7/21/2018 6:33 PM     HISTORY: Left hemiparesthesias of acute onset.    TECHNIQUE: Axial images were obtained through the head and neck  without and with intravenous contrast. 70 mL of Isovue 370 given.  Multiplanar reconstructions were performed. 3-D reconstructions off a  remote workstation were also acquired. Carotid stenoses were evaluated  by comparing the caliber the proximal internal carotid artery to the  caliber the internal carotid artery. Radiation dose for this scan was  reduced using automated exposure control, adjustment of the mA and/or  kV according to patient size, or iterative reconstruction technique.     FINDINGS:    Brachiocephalic vessels: Normal.    Right carotid system: Minimal calcific plaque. No stenosis or  dissection.    Left carotid system: Minimal plaque. No evidence for stenosis or  dissection.    Right vertebral artery: Normal nondominant vessel.    Left vertebral artery: Normal.    [place] of [place]: There is abrupt occlusion of a right M2 segment  approximately 1.5 cm from its origin. The other right M2 segment also  has markedly diminished flow after 2 cm. The left middle cerebral  artery  branches are normal. The distal internal carotid arteries,  basilar artery, and proximal anterior and posterior cerebral arteries  are normal. Incidental note is made of primary origin of left  posterior cerebral artery off the left internal carotid artery. There  is no evidence for aneurysm.    Other findings: Postcontrast images show a paucity of vessels in the  right middle cerebral artery distribution particularly posteriorly.  Some degenerative changes are seen in the spine.      Impression    IMPRESSION:  1. Abnormal right M2 segments with occlusion of one of the M2 branches  and near occlusion of the second branch. This M2 abnormality called to  the emergency room at 1844 on 7/21/2018.  2. Mild atherosclerotic disease involving the carotid bifurcations  without stenosis.     LEON FLORES MD   CT Head w Contrast    Narrative    CT HEAD WITH CONTRAST 7/21/2018 6:44 PM     HISTORY: Code Stroke.     TECHNIQUE: Axial images were obtained through the brain with  intravenous contrast. 50 mL of Isovue-370 was given. Radiation dose  for this scan was reduced using automated exposure control, adjustment  of the mA and/or kV according to patient size, or iterative  reconstruction technique.    FINDINGS: There is a large perfusion abnormality in the posterior  aspect of the right middle cerebral artery territory which is  relatively matched on cerebral blood flow and cerebral blood volume  images but the cerebral blood flow images may be slightly larger. The  perfusion images suggest that this is predominately infarct although  there may be a small amount of penumbra. There is also an old left  frontal infarct and a right occipital infarct.      Impression    IMPRESSION:  1. Large posterior right middle cerebral artery territory perfusion  abnormality which is primarily infarct but there may be some  peripheral penumbra.  2. Old left frontal and right occipital infarcts.     LEON FLORES MD   IR Carotid Cerebral  Angiogram Bilateral    Narrative    1. ENDOVASCULAR MECHANICAL THROMBECTOMY OF RIGHT MIDDLE CEREBRAL   ARTERY POSTERIOR DIVISION M2 TRUNK THROMBOEMBOLIC OCCLUSION  2.  CEREBRAL ANGIOGRAM: SELECTIVE CATHETERIZATION AND INJECTION OF THE   RIGHT INTERNAL CAROTID ARTERY, AND THE RIGHT COMMON CAROTID ARTERY  WITH ANGIOGRAPHIC IMAGING CENTERED OVER THE HEAD    DATE: 7/21/2018    HISTORY: 86 year man with history of atrial fibrillation presents with  severe right hemispheric stroke symptoms including a aphasia and  left-sided weakness. CTA reveals occlusion of the posterior division  M2 trunk right middle cerebral artery. The patient has been given  intravenous thrombolytic therapy.    CONSENT: The procedure and its indications, major risks, benefits, and  alternatives were discussed with family via [place] .  Risks, including but not limited to, pain, groin hematoma, blood loss,  blood vessel damage, allergic reaction, renal damage, cardiopulmonary  compromise, worsening stroke, intracranial hemorrhage, sedation  reaction, and death were discussed. Signed informed consent was  obtained from wife.    CONTRAST: 40 mL Isovue 300    FLUOROSCOPY TIME: 31.4 minutes    ESTIMATED BLOOD LOSS: 100 ml    SEDATION: The patient was reassessed for sedation immediately prior to  the procedure. He satisfied criteria for sedation. Moderate  intravenous sedation was administered during the procedure by both  physician and a nurse trained in sedation practices. A total of 2.5 mg  of Versed and 125 mcg of fentanyl were administered intravenously  during the exam. Vital signs were continuously monitored, remaining  within acceptable range. The patient's mental/neurologic status was  unchanged at the exam was completed. The patient was monitored in the  fluoroscopy suite for 74 minutes.    PROCEDURE: The patient was placed supine upon the angiography table.  The skin of the right groin was prepped and draped in sterile fashion.  The  skin was infiltrated with 1% lidocaine. The right common femoral  artery was accessed with a micropuncture set that was exchanged for a  5 Cameroonian short sheath. A 5 Cameroonian diagnostic catheter was then  advanced over the angle Glidewire and used to selectively catheterize  and inject the right common carotid artery. Two-dimensional digital  subtraction angiograms the head were obtained.     PRETREATMENT ANGIOGRAPHIC FINDINGS:   There is occlusion of the posterior division M2 trunk right middle  cerebral artery. The M1 trunk in the anterior division of the right  middle cerebral artery are patent. Intracranial right internal carotid  artery and right anterior cerebral artery widely patent. Right  external carotid artery branches grossly patent. Venous structures  grossly patent.    Stenosis measurements are made using NASCET criteria.    PROCEDURE CONTINUED: There is a tortuous band within the proximal  right common carotid artery that makes placement of the guide sheath  difficult. H1 and Gramajo 2 diagnostic catheters were attempted  initially in order to placed the guide sheath. These attempts were  unsuccessful. A 5 Cam[place]onian Gramajo 3 diagnostic catheter was  successfully advanced in the cervical right internal carotid artery  where it was exchanged along with the indwelling sheath for a 6 Cam[place]onian  90 cm long neuron Max guide sheath. A 4 Max Penumbra reperfusion  catheter was then advanced over a Fathom wire until the reperfusion  catheter was present within the right middle cerebral artery at the  point of occlusion. Aspiration was performed 3 separate passes were  made with the device. Follow-up angiograms after the first 2 passes  showed no significant improvement in flow.    The reperfusion catheter was ultimately removed after the third pass.  Followup guide catheter angiogram of the head was performed. This  shows that there has been reviewed reperfusion of the posterior  division M2 trunk right middle cerebral  artery. There is a persistent  M3 level branch occlusion of the right middle cerebral artery  posterior division that supplies the parietal lobe. This is too far  distal to intervene upon with mechanical devices. Greater than 50% of  the remaining posterior division territory is been reperfused. No  evidence of complication.    The guide sheath was pulled down into the thoracic aorta where was  exchanged over a Hutton wire for a 7 Citizen of the [place] short sheath. This sheath  was secured to the skin with suture material. A sterile dressing was  applied. There was no apparent complication. The patient was then sent  for a routine post treatment head CT to evaluate for complicating  hemorrhage.    INTERVENTIONAL TIME POINTS:  Call team paged: 19:05 hours    Sheath placement: 20:05    Clot ID: [id number]    Device deployment: 20:44    Total aspiration time: 14 minutes    Middle cerebral artery reperfusion time: 21:07    Pre treatment: TICI 0    Post treatment: TICI 2b      Impression    IMPRESSION:   Patient underwent endovascular mechanical thrombectomy of a right  middle cerebral artery posterior division M2 trunk occlusion. At the  conclusion of the procedure there has been reperfusion of  approximately 60% of the previously occluded right middle cerebral  artery territory. There is a persistent posterior division M3 branch  occlusion that is too far distal to intervene upon with mechanical  devices.            ILANA ROBLEDO MD   CT Head w/o Contrast   Result Value    Radiologist flags Intracranial hemorrhage (AA)    Narrative    CT SCAN OF THE HEAD WITHOUT CONTRAST   7/21/2018 9:53 PM     HISTORY: Right MCA embolus. Status post TPA and IR intervention.     TECHNIQUE:  Axial images of the head and coronal reformations without  IV contrast material.  Radiation dose for this scan was reduced using  automated exposure control, adjustment of the mA and/or kV according  to patient size, or iterative reconstruction  technique.    COMPARISON: 7/21/2018 at 1825.    FINDINGS: During the interval, there has been development of a left  thalamic hematoma measuring approximately 3.0 x 3.5 x 3.1 cm. There is  intraventricular extension with blood in the left lateral ventricle.  There is also a second parenchymal hemorrhage measuring 1.2 cm in the  left parietal region with some adjacent subarachnoid hemorrhage. Small  amount of subarachnoid or parenchymal hemorrhages also noted in the  right temporal lobe. There is also subarachnoid hemorrhage over the  right convexity. There is no evidence for midline shift. Old left  frontal and right occipital lobe infarcts are present. Cerebral  atrophy is present along with some nonspecific white matter changes.      Impression    IMPRESSION: Interval development of left thalamic and left parietal  parenchymal hemorrhages and intraventricular and subarachnoid  hemorrhages.   [Critical Result: Intracranial hemorrhage]    Finding was identified on 7/21/2018 9:57 PM.     The patient's nurse named Aminata was contacted by me on 7/21/2018  10:01 PM and verbalized understanding of the critical result.        LEON FLORES MD   CT Head w/o Contrast    Narrative    CT SCAN OF THE HEAD WITHOUT CONTRAST   7/22/2018 1:33 AM     HISTORY: dual energy HCT: follow-up hemorrhagic tranformation of  stroke s/p IV tPA /endovascular;     TECHNIQUE:  Axial images of the head and coronal reformations without  IV contrast material. Radiation dose for this scan was reduced using  automated exposure control, adjustment of the mA and/or kV according  to patient size, or iterative reconstruction technique.    COMPARISON: CT dated 7/21/2018    FINDINGS:  Again noted is a parenchymal hemorrhage in the left  thalamic region. This currently measures approximately 3 cm in AP  dimension by 3 cm in transverse dimension and 3.3 cm in cephalocaudad  dimension. It has not changed significantly in size when compared to  the previous  scan. Hemorrhages also seen in the left parietal region.  This is also unchanged. It measures about 1.3 cm in cephalocaudad  dimension by 1.3 cm in transverse dimension. A small amount of  subarachnoid blood is again noted. Right occipital cortical infarct is  also again noted and is unchanged. No new areas of hemorrhage are  seen. A evolving infarct is seen in the right frontal region. This  measures approximately 5.4 cm in AP dimension by 4.2 cm in transverse  dimension. Chronic infarcts are seen in the left frontal and right  occipital regions. The visualized portions of the sinuses and mastoids  appear normal. There is no evidence of trauma.      Impression    IMPRESSION:   1. No change in the parenchymal hemorrhage in the left thalamic region  or the subarachnoid hemorrhage. No change in the hematoma in the left  parietal region.     2. Evolving infarct in the right frontal lobe.  3. Chronic infarcts in the right occipital and left frontal regions.    I agree with the preliminary report that was communicated to the  referring physician.    PIERO REA MD

## 2018-07-22 NOTE — PROCEDURES
"Interventional Neuroradiology Pre Sedation Assessment    We are asked to perform a stroke intervenion on this 86 year old male        Allergies   Allergen Reactions     No Clinical Screening - See Comments      ekg patches= rash       Sedation history: Previous problems with sedation. none  History of sleep apnea : none    Exam:   BP (!) 131/114  Pulse 93  Temp 98.7  F (37.1  C) (Temporal)  Resp 16  Ht 1.803 m (5' 11\")  Wt 72.4 kg (159 lb 11.2 oz)  SpO2 100%  BMI 22.27 kg/m2  Neuro: global aphasia left sided weakness  Cardiac: IRR  Lungs: clear  Mallampati:2  ASA: 3    Okay to proceed with planned procedure using moderate IV sedation  Procedure its risks, benefits, and alternatives were discussed with wife.  Questions and answered and they give written and verbal consent to proceed.    Jose Barnes  "

## 2018-07-22 NOTE — PROGRESS NOTES
Neurocrit/Stroke Note  7/22/2018     Interim: pt admitted last night w R MCA occlusion and NIHSS 27, had TPA and thrombectomy w partial recanulization, then had subsequent ICH and SAH predominantly in the right hemisphere, with IVH.     Exam:   Vitals:    07/22/18 1015 07/22/18 1030 07/22/18 1045 07/22/18 1100   BP: 130/90 (!) 110/92 107/82 113/85   BP Location:       Pulse:       Resp: 28 17 22 21   Temp:       TempSrc:       SpO2: 97% 94% 95% 96%   Weight:       Height:        Gen: unrepsonsive. Nasal trumpet and EEG cap in place. Mouth open, laying in bed.   Neuro:  Unresponsive, does not awake to voice or sternal rub.   CN: roving eye movements. Face grossly symmetric but no facial movements. Pupils small. DOlls eye maneuver intact.  Motor: spont movement of LLE. Other extremiteies wihtout spont movemnet. Decerebrate posturing and triple reflex in left arm, right arm, and right leg.  Sensory: does not w/d to pain in LUE, RUE, RLE. Withdraws LLE.     Imaging: reviewed. IMPRESSION:   1. No change in the parenchymal hemorrhage in the left thalamic region  or the subarachnoid hemorrhage. No change in the hematoma in the left  parietal region.     2. Evolving infarct in the right frontal lobe.  3. Chronic infarcts in the right occipital and left frontal regions.      Impression:  Hx afib, not on anticoag  Hx CVA  Acute R MCA territory infarct  S/p TPA, s/p thrombectomy  SAH, ICH, IVH    Recommendations:   Discussed goals of care with family (wife, several children, in-laws, and 2 grandchildren). He would not want any extensive life-prolonging measures. They understand that he will die without artificial nurishment, and he would not want artificial feedings. His prognosis would be poor even with aggressive cares. They all appear to be in agreement and will pursue comfort cares.       Seen/discussed with Dr. Ayon.    Charla Ibarra MD  Stroke fellow

## 2018-07-22 NOTE — PROCEDURES
Procedure Date: 2018      ONE HOUR VIDEO ELECTROENCEPHALOGRAM       EEG #:       DATE OF STUDY:  2018       SOURCE FILE DURATION:  One hour.        PATIENT INFORMATION:  An 86-year-old male presents with left-sided weakness, aphasia and altered mental status.  EEG is being done to evaluate for seizures.     TECHNICAL SUMMARY: This video EEG monitoring procedure was performed with 23 scalp electrodes in 10-20 system placements, and additional scalp, precordial and other surface electrodes used for electrical referencing and artifact detection. Video was reviewed intermittently by EEG technologist and physician for electro-clinical seizures.   BACKGROUND:  The patient has diffuse generalized delta slowing and there is no parietooccipital rhythm.  There is no sleep-wake distinction.  There is no sleep architecture.  There are sometimes faster frequencies into the alpha, theta and alpha range in the bifrontal region and midline electrodes.      ACTIVATION PROCEDURES:  Photic stimulation and hyperventilation not done.      EPILEPTIFORM DISCHARGES:  None.      ICTAL:  None.      IMPRESSION:  One hour video EEG is abnormal due to the presence of diffuse generalized continuous delta slowing consistent with a severe encephalopathy.  No electrographic seizures or epileptiform discharges or paroxysmal behavioral spells are seen.  Clinical correlation is advised.         ALLI BUSTILLOS MD             D: 2018   T: 2018   MT: LIYA      Name:     CRISTELA ZAMORA   MRN:      [id number]        Account:        [id number]   :      10/10/1931           Procedure Date: 2018      Document: [id number]

## 2018-07-22 NOTE — PROGRESS NOTES
Vascular Neurology Progress Note    ____________________________________________________________    Admission Summary:  Delta Otoole is a 86 year old male with Afib, prior cortical strokes, previously on Xarelto (stopped taking it one year prior per family) who presents with left-sided weakness, aphasia, and decreased LOC.  NIHSS = 27 for focal and global deficits (decreased LOC).  At baseline he lived at home and was independent, he had some early memory problems. Head CT shows prior cortical strokes.  CTA showed proximal R. MCA occlusion within the M2 segment.  CTP: Large matched perfusion deficit in the R. Posterior MCA territory.  Case was discussed with his family via telestroke who consented to IV tPA and thrombectomy.      Upon arrival in IR I t was noted that he has a persistent proximal R. M2 occlusion and post-procedure he had partial recanalization.  Head CT post procedure showed PH2 hemorrhagic transformation with blood/contrast hyperdensity centered in the contralateral thalamus/basal ganglia with decompression into the left lateral vertricle.  Additionally there is a second smaller hematoma high in the R frontal lobe.  There is scattered sulcal SAH.  He received 10 units of cryoprecipitate (Fibrinogen 245).      Discussed case at length with the family.  They reaffirmed that he is DNR/DNI.  I explained that the imaging was concerning and we would have a more realistic picture of his prognosis with follow-up imaging in the morning.    They would like to be called if he worsens:  Contact: Natividad Carcamo: cell [phone], home [phone]    Last 24 hours:      As above.  No clinical change corresponding to ICH.      Impression:    1. R. M2 occlusion s/p IV tPA and endovascular treatment  2. PH2 hemorrhagic transformation  3. Afib  4. DNR/DNI    Recommendations:     1. Repeat Head CT now  2. Continue SBP cap 120 with nicardipine  3. We will discuss prognosis with patient in am    Please contact the  stroke service with any questions: link to Text page or feel free to call my cellphone.    Marv Hollingsworth MD, MS  Vascular Neurology  July 22, 2018    I personally reviewed all relevant labs and neuroimaging.  I spent 90 minutes critical care time reviewing labs, diagnostic studies, neuroimaging, and evaluating and treating the patient for his R. MCA stroke with hemorrhagic transformation s/p IV tPA and thrombectomy.    ____________________________________________________________________        Medications:      Current Facility-Administered Medications:      0.9% sodium chloride BOLUS, 250 mL, Intravenous, Once PRN, Mike Pruett, DO     acetaminophen (TYLENOL) Suppository 650 mg, 650 mg, Rectal, Q4H PRN, Mike Pruett, DO     glucose gel 15-30 g, 15-30 g, Oral, Q15 Min PRN **OR** dextrose 50 % injection 25-50 mL, 25-50 mL, Intravenous, Q15 Min PRN **OR** glucagon injection 1 mg, 1 mg, Subcutaneous, Q15 Min PRN, Jose Barnes MD     Hold: Metformin and metformin containing medications on day of the procedure and for 48 hours after IV contrast given- Patients with acute kidney injury or severe chronic kidney disease (stage IV or stage V; i.e., eGFR less than 30), , Does not apply, HOLD, Jose Barnes MD     Medication Instructions: No D5W IV solutions unless patient hypoglycemic., , Does not apply, Continuous PRN, Mike Pruett, DO     metoclopramide (REGLAN) tablet 5 mg, 5 mg, Oral, Q6H PRN **OR** metoclopramide (REGLAN) injection 5 mg, 5 mg, Intravenous, Q6H PRN, Mike Pruett, DO     naloxone (NARCAN) injection 0.1-0.4 mg, 0.1-0.4 mg, Intravenous, Q2 Min PRN, Mike Pruett, DO     niCARdipine 40 mg in 200 mL 0.9% NaCl (CARDENE) infusion, 2.5-15 mg/hr, Intravenous, Continuous, Mike Pruett, DO, Last Rate: 12.5 mL/hr at 07/21/18 2229, 2.5 mg/hr at 07/21/18 2229     NO anticoagulation/antiplatelet medications or NSAID in first 24  "hours after alteplase (ACTIVASE) administration or after thrombectomy, , Does not apply, Continuous PRN, Mike Pruett, DO     ondansetron (ZOFRAN-ODT) ODT tab 4 mg, 4 mg, Oral, Q6H PRN **OR** ondansetron (ZOFRAN) injection 4 mg, 4 mg, Intravenous, Q6H PRN, Mike Pruett,      prochlorperazine (COMPAZINE) injection 5 mg, 5 mg, Intravenous, Q6H PRN **OR** prochlorperazine (COMPAZINE) tablet 5 mg, 5 mg, Oral, Q6H PRN **OR** prochlorperazine (COMPAZINE) Suppository 12.5 mg, 12.5 mg, Rectal, Q12H PRN, Mike Pruett,      sodium chloride 0.9% infusion, , Intravenous, Continuous, Mike Pruett, DO     Stop alteplase (ACTIVASE) infusion IF any signs of major systemic bleeding, any neurological deterioration, development of severe headache, sudden severe elevation of blood pressure, or new nausea or vomiting and Call provider, , Does not apply, Continuous PRN, Mike Pruett, DO      Vital Signs:  B/P: 103/79, T: 98.7, P: 93, R: 13    /79  Pulse 93  Temp 98.7  F (37.1  C) (Temporal)  Resp 13  Ht 1.803 m (5' 11\")  Wt 72.4 kg (159 lb 11.2 oz)  SpO2 100%  BMI 22.27 kg/m2    Neuro:  Eyes closed, actively resists eye opening, PERRL, LEFT gaze preference, attempts to direct gaze to voice, extensor posturing LUE, triple flexion LLE, purposeful withdrawal on right.  Increase tone throughout R>L with upgoing toe bilaterally, sustained on L.    Labs/Studies:  CBC:     Recent Labs  Lab 07/21/18 1812   WBC 5.7   RBC 4.42   HGB 14.2   HCT 41.4        Basic Metabolic Panel:   Recent Labs   Lab Test  07/21/18   1812 02/04/16 10/22/15  05/18/15   1627  03/19/15   1620   NA  139  139  138  139  135   POTASSIUM  4.6  4.3  4.7  5.0  4.3   CHLORIDE  105  106  107  104  103   CO2  28   --    --   23  27   BUN  26  29  32  27  16   CR  1.12  1.22  1.20  1.16  0.89   GLC  93  99  91  81  88   JALEESA  8.8  9.2  9.1  8.9  8.3*     Liver panel:  No lab results " found.  INR:  Recent Labs   Lab Test  07/21/18   2211  07/21/18   1812  03/19/15   1620   INR  1.47*  1.14  1.20*      Lipid Profile:  Recent Labs   Lab Test 02/04/16  08/13/15   1120  03/20/15   0515   CHOL  153  125  105   HDL  66  88  46   LDL  65  28  49   TRIG  110  46  50   CHOLHDLRATIO   --   1.4  2.3     A1C:   Recent Labs   Lab Test  03/18/15   1703   A1C  5.4     Troponin I:   Recent Labs   Lab Test  03/20/15   0120  03/19/15   2000  03/19/15   1620   TROPI  0.227*  0.242*  0.264*         Imaging:  Relevant findings as per the Impression above.

## 2018-07-22 NOTE — IR NOTE
7Fr arterial sheath removed. Manual pressure was held for 25 minutes. Site was dry with no hematoma. Sterile guaze and Tegaderm was placed on the site. Pt was given instructions and report was given to ICU RN. 35 min face to face time with pt.

## 2018-07-23 PROCEDURE — 25000132 ZZH RX MED GY IP 250 OP 250 PS 637: Performed by: INTERNAL MEDICINE

## 2018-07-23 PROCEDURE — 12000007 ZZH R&B INTERMEDIATE

## 2018-07-23 PROCEDURE — 25000125 ZZHC RX 250: Performed by: INTERNAL MEDICINE

## 2018-07-23 PROCEDURE — 25000128 H RX IP 250 OP 636: Performed by: INTERNAL MEDICINE

## 2018-07-23 PROCEDURE — 99207 ZZC NON-BILLABLE SERV PER CHARTING: CPT | Performed by: HOSPITALIST

## 2018-07-23 RX ADMIN — ATROPINE SULFATE 2 DROP: 10 SOLUTION/ DROPS OPHTHALMIC at 01:45

## 2018-07-23 RX ADMIN — MORPHINE SULFATE 2 MG: 2 INJECTION, SOLUTION INTRAMUSCULAR; INTRAVENOUS at 01:44

## 2018-07-23 RX ADMIN — LORAZEPAM 1 MG: 2 INJECTION INTRAMUSCULAR; INTRAVENOUS at 02:38

## 2018-07-23 RX ADMIN — MORPHINE SULFATE 2 MG: 2 INJECTION, SOLUTION INTRAMUSCULAR; INTRAVENOUS at 06:53

## 2018-07-23 RX ADMIN — MORPHINE SULFATE 10 MG: 100 SOLUTION ORAL at 04:54

## 2018-07-23 RX ADMIN — MORPHINE SULFATE 2 MG: 2 INJECTION, SOLUTION INTRAMUSCULAR; INTRAVENOUS at 09:26

## 2018-07-23 RX ADMIN — GLYCOPYRROLATE 0.1 MG: 0.2 INJECTION, SOLUTION INTRAMUSCULAR; INTRAVENOUS at 02:35

## 2018-07-23 RX ADMIN — ATROPINE SULFATE 2 DROP: 10 SOLUTION/ DROPS OPHTHALMIC at 11:53

## 2018-07-23 RX ADMIN — MORPHINE SULFATE 2 MG: 2 INJECTION, SOLUTION INTRAMUSCULAR; INTRAVENOUS at 17:42

## 2018-07-23 RX ADMIN — ATROPINE SULFATE 2 DROP: 10 SOLUTION/ DROPS OPHTHALMIC at 06:53

## 2018-07-23 RX ADMIN — MORPHINE SULFATE 2 MG: 2 INJECTION, SOLUTION INTRAMUSCULAR; INTRAVENOUS at 03:25

## 2018-07-23 ASSESSMENT — ACTIVITIES OF DAILY LIVING (ADL)
TOILETING: 4-->COMPLETELY DEPENDENT
SWALLOWING: 2-->DIFFICULTY SWALLOWING LIQUIDS/FOODS
ADLS_ACUITY_SCORE: 36
DRESS: 4-->COMPLETELY DEPENDENT
AMBULATION: 4-->COMPLETELY DEPENDENT
ADLS_ACUITY_SCORE: 36
RETIRED_EATING: 4-->COMPLETELY DEPENDENT
ADLS_ACUITY_SCORE: 36
ADLS_ACUITY_SCORE: 36
RETIRED_COMMUNICATION: 3-->UNABLE TO SPEAK (NOT RELATED TO LANGUAGE BARRIER)
COGNITION: 2 - DIFFICULTY WITH ORGANIZING THOUGHTS
ADLS_ACUITY_SCORE: 36
FALL_HISTORY_WITHIN_LAST_SIX_MONTHS: NO
ADLS_ACUITY_SCORE: 11
TRANSFERRING: 4-->COMPLETELY DEPENDENT
BATHING: 4-->COMPLETELY DEPENDENT
WHICH_OF_THE_ABOVE_FUNCTIONAL_RISKS_HAD_A_RECENT_ONSET_OR_CHANGE?: AMBULATION;TRANSFERRING;TOILETING;BATHING;DRESSING;EATING;COGNITION;COMMUNICATION/SPEECH

## 2018-07-23 NOTE — PROGRESS NOTES
[facility]    Hospitalist Progress Note    Assessment & Plan   Delta Otoole is a 86 year old male who presents with stroke.  He underwent mechanical thrombectomy with subsequent hemorrhagic transformation.  Following discussion with family, they elected to transition to comfort care given his poor prognosis.      Large posterior RCA stroke  Atrial fibrillation with controlled ventricular response  Sick Sinus syndrome  Memory difficulties ? Early dementia  HLD  Patient with proximal R MCA occlusion with in the M2 sepgement with large matched pefusion deficit in the posterior MCA territory.  Received TPA and underwent catheter directed thrombectomy   Obtunded following procedure with CT showing hemorrhagic conversion   Family decided on comfort measures only  - continue comfort measures  - patient is currently comfortable and family reports no concerns, will discontinue Palliative Care consult  - social work consult to arrange hospice meeting and discuss disposition with family      DVT Prophylaxis: None as comfort care  Code Status: DNR/DNI     Disposition: Expected discharge in 1-2 days if remains stable.     Ike Holly MD      Interval History   Family reports he has remained comfortable.  Report he is warm and would like temperature checked.  Otherwise have no concerns.  Discussed hospice informational meeting to which they are open in addition to that we start discharge planning process, understanding that if we feel death is imminent we would keep him in the hospital.    -Data reviewed today: I reviewed all new labs and imaging results over the last 24 hours.  No EKG's or imaging reviewed.    Physical Exam       BP: 125/79   Heart Rate: 121 Resp: (!) 36 SpO2: 91 % O2 Device: None (Room air) Oxygen Delivery: 3 LPM  Vitals:    07/21/18 1813 07/22/18 0600   Weight: 72.4 kg (159 lb 11.2 oz) 73.6 kg (162 lb 4.1 oz)     Vital Signs with Ranges  Heart Rate:  [110-128] 121  Resp:  [28-36] 36  BP:  (121-125)/(79-91) 125/79  SpO2:  [91 %-96 %] 91 %  I/O last 3 completed shifts:  In: 738.13 [I.V.:738.13]  Out: 515 [Urine:515]    Constitutional: Unresponsive, no acute distress   Respiratory: tachypnic, coarse lung sounds referred from secretions in upper airway  Cardiovascular: heart sounds cannot be auscultated over lung sounds  GI:  Musc:  Extremities warm and well perfused  Neuro : comatose, no movement noted during visit    Medications     - MEDICATION INSTRUCTIONS -       - MEDICATION INSTRUCTIONS -       - MEDICATION INSTRUCTIONS -         glycopyrrolate  1 mg Oral TID       Data     Recent Labs  Lab 07/22/18  0430 07/21/18  2211 07/21/18  1812   WBC 11.1*  --  5.7   HGB 13.3  --  14.2   MCV 92  --  94     --  234   INR 1.37* 1.47* 1.14     --  139   POTASSIUM 4.2  --  4.6   CHLORIDE 106  --  105   CO2 24  --  28   BUN 19  --  26   CR 0.87  --  1.12   ANIONGAP 8  --  6   JALEESA 8.1*  --  8.8   *  --  93   ALBUMIN 2.8*  --   --    PROTTOTAL 7.3  --   --    BILITOTAL 0.6  --   --    ALKPHOS 62  --   --    ALT 20  --   --    AST 31  --   --    TROPI <0.015  --   --        Recent Labs  Lab 07/22/18  0846 07/22/18  0430 07/21/18  1818 07/21/18  1812   GLC  --  145*  --  93   *  --  88  --        Imaging:   Recent Results (from the past 24 hour(s))   IR Discontinue Sheath    Narrative    This exam was marked as non-reportable because it will not be read by a   radiologist or a [place] non-radiologist provider.

## 2018-07-23 NOTE — PLAN OF CARE
Problem: Patient Care Overview  Goal: Plan of Care/Patient Progress Review  Outcome: Improving  Patient unresponsive.  On comfort cares.  Oral care/suctioning done.  Turned and repositioned.  Respirations 28 to 36, PRN morphine and ativan.  PRN atropine drops and IV Robinul given X1 for oral secretions.  Young patent.

## 2018-07-23 NOTE — PLAN OF CARE
Problem: Patient Care Overview  Goal: Plan of Care/Patient Progress Review  Outcome: No Change  Pt unresponsive.On comfort care.IV morphine X1 given for dyspnea.Turned and repositioned Q2hrs.Young in place,Family at bedside.Oral care and suction done.PRN atropine given for oral secretions.Will continue to monitor.

## 2018-07-23 NOTE — PLAN OF CARE
Problem: Patient Care Overview  Goal: Plan of Care/Patient Progress Review  Outcome: No Change  Pt unresponsive, comfort cares maintained. Prn morphine given for dyspnea. Care cart provided for family visiting, will continue to monitor.

## 2018-07-23 NOTE — PROGRESS NOTES
D: Consult acknowledged. Met with family, who is requesting a care conference for 1530 tomorrow. CHRISTINE also asked about hospice. Family said they are deciding between discharging to home or a nursing home. CHRISTINE explained that the hospice navigator could answer their questions. They agreed. CHRISTINE left a VM for Cindi with this request. CHRISTINE also checked with the charge RN to ensure a [place]  will be present.

## 2018-07-23 NOTE — PLAN OF CARE
Problem: Patient Care Overview  Goal: Plan of Care/Patient Progress Review  Atropine soln given PRN. Suctioned and oral cares done PRN. Pt looks comfortable.

## 2018-07-23 NOTE — PROGRESS NOTES
SPIRITUAL HEALTH SERVICES Progress Note  [facility]     visited pt's son with , Deepika, on request. Pt is on comfort cares.  attempted to visit pt earlier but pt was unresponsive. Pt's son reported that pt is [employer] and only speaks Cuban. Pt's son declined a  visit for pt. Pt's son reported that the family has no needs from  either.  informed pt's son of our availability if any needs arise.    Abe Kitchen M.Div.  Chaplain Resident  [phone] Pager

## 2018-07-24 PROCEDURE — 99231 SBSQ HOSP IP/OBS SF/LOW 25: CPT | Performed by: HOSPITALIST

## 2018-07-24 PROCEDURE — 25000128 H RX IP 250 OP 636: Performed by: INTERNAL MEDICINE

## 2018-07-24 PROCEDURE — 25000132 ZZH RX MED GY IP 250 OP 250 PS 637: Performed by: INTERNAL MEDICINE

## 2018-07-24 PROCEDURE — 12000007 ZZH R&B INTERMEDIATE

## 2018-07-24 RX ORDER — BISACODYL 10 MG
10 SUPPOSITORY, RECTAL RECTAL DAILY PRN
Qty: 2 SUPPOSITORY | Refills: 0 | Status: SHIPPED | OUTPATIENT
Start: 2018-07-24

## 2018-07-24 RX ORDER — MORPHINE SULFATE 100 MG/5ML
5-10 SOLUTION ORAL
Qty: 30 ML | Refills: 0 | Status: SHIPPED | OUTPATIENT
Start: 2018-07-24

## 2018-07-24 RX ORDER — LORAZEPAM 2 MG/ML
.25-.5 CONCENTRATE ORAL EVERY 4 HOURS PRN
Qty: 30 ML | Refills: 0 | Status: SHIPPED | OUTPATIENT
Start: 2018-07-24

## 2018-07-24 RX ORDER — HALOPERIDOL 2 MG/ML
.5-1 SOLUTION ORAL EVERY 6 HOURS PRN
Qty: 15 ML | Refills: 0 | Status: SHIPPED | OUTPATIENT
Start: 2018-07-24

## 2018-07-24 RX ORDER — ACETAMINOPHEN 650 MG/1
650 SUPPOSITORY RECTAL EVERY 4 HOURS PRN
Qty: 2 SUPPOSITORY | Refills: 0 | Status: SHIPPED | OUTPATIENT
Start: 2018-07-24

## 2018-07-24 RX ORDER — ATROPINE SULFATE 10 MG/ML
2-4 SOLUTION/ DROPS OPHTHALMIC
Qty: 5 ML | Refills: 0 | Status: SHIPPED | OUTPATIENT
Start: 2018-07-24

## 2018-07-24 RX ADMIN — MORPHINE SULFATE 2 MG: 2 INJECTION, SOLUTION INTRAMUSCULAR; INTRAVENOUS at 12:59

## 2018-07-24 RX ADMIN — ATROPINE SULFATE 2 DROP: 10 SOLUTION/ DROPS OPHTHALMIC at 08:12

## 2018-07-24 RX ADMIN — MORPHINE SULFATE 10 MG: 100 SOLUTION ORAL at 21:17

## 2018-07-24 RX ADMIN — MORPHINE SULFATE 2 MG: 2 INJECTION, SOLUTION INTRAMUSCULAR; INTRAVENOUS at 17:02

## 2018-07-24 RX ADMIN — ATROPINE SULFATE 2 DROP: 10 SOLUTION/ DROPS OPHTHALMIC at 01:42

## 2018-07-24 RX ADMIN — MORPHINE SULFATE 10 MG: 10 SOLUTION ORAL at 18:54

## 2018-07-24 ASSESSMENT — ACTIVITIES OF DAILY LIVING (ADL)
ADLS_ACUITY_SCORE: 36

## 2018-07-24 NOTE — PROGRESS NOTES
"[facility]    Hospitalist Progress Note    Assessment & Plan   Delta Otoole is a 86 year old male who presents with stroke.  He underwent mechanical thrombectomy with subsequent hemorrhagic transformation.  Following discussion with family, they elected to transition to comfort care given his poor prognosis.      Large posterior RCA stroke  Atrial fibrillation with controlled ventricular response  Sick Sinus syndrome  Memory difficulties ? Early dementia  HLD  Patient with proximal R MCA occlusion with in the M2 sepgement with large matched pefusion deficit in the posterior MCA territory.  Received TPA and underwent catheter directed thrombectomy   Obtunded following procedure with CT showing hemorrhagic conversion   Family decided on comfort measures only  - continue comfort measures  - hospice informational meeting this afternoon, family deciding whether to go home vs LTC  - family declined morphine this AM as they wanted to see if patient would \"wake up\" and asked about repeating head imaging to ensure no improvement has occurred.  Explained that repeat head imaging is not indicated as very unlikely to have changed and if treatment was to be pursued, he would need to be hooked up to lines and tubes, which family re-iterated they would not want.  - encouraged generous use of morphine to treat any air hunger (patient is currently tacypnic)      DVT Prophylaxis: None as comfort care  Code Status: DNR/DNI     Disposition: Expected discharge tomorrow if remains stable and safe dispo plan in place.     Ike Holly MD      Interval History   Family reports no changes overnight, feel he remains comfortable with no other concerns.  See documentation above regarding morphine and questions regarding repeat head imaging.    -Data reviewed today: I reviewed all new labs and imaging results over the last 24 hours.  No EKG's or imaging reviewed.    Physical Exam   Temp: 100.3  F (37.9  C) Temp src: Oral " BP: 119/71   Heart Rate: 64 Resp: 28 SpO2: 91 % O2 Device: Nasal cannula    Vitals:    07/21/18 1813 07/22/18 0600   Weight: 72.4 kg (159 lb 11.2 oz) 73.6 kg (162 lb 4.1 oz)     Vital Signs with Ranges  Temp:  [100.3  F (37.9  C)] 100.3  F (37.9  C)  Heart Rate:  [64] 64  Resp:  [28-30] 28  BP: (119)/(71) 119/71  SpO2:  [91 %] 91 %  I/O last 3 completed shifts:  In: 0   Out: 275 [Urine:275]    Constitutional: Unresponsive  Respiratory: tachypnic, mild wheezing bilaterally with scattered crackles  Cardiovascular: heart sounds cannot be auscultated over lung sounds  GI:  Musc:  Extremities warm and well perfused with strong peripheral pulses  Neuro : comatose, no movement noted during visit    Medications     - MEDICATION INSTRUCTIONS -       - MEDICATION INSTRUCTIONS -       - MEDICATION INSTRUCTIONS -         glycopyrrolate  1 mg Oral TID       Data     Recent Labs  Lab 07/22/18 0430 07/21/18 2211 07/21/18 1812   WBC 11.1*  --  5.7   HGB 13.3  --  14.2   MCV 92  --  94     --  234   INR 1.37* 1.47* 1.14     --  139   POTASSIUM 4.2  --  4.6   CHLORIDE 106  --  105   CO2 24  --  28   BUN 19  --  26   CR 0.87  --  1.12   ANIONGAP 8  --  6   JALEESA 8.1*  --  8.8   *  --  93   ALBUMIN 2.8*  --   --    PROTTOTAL 7.3  --   --    BILITOTAL 0.6  --   --    ALKPHOS 62  --   --    ALT 20  --   --    AST 31  --   --    TROPI <0.015  --   --        Recent Labs  Lab 07/22/18  0846 07/22/18  0430 07/21/18  1818 07/21/18  1812   GLC  --  145*  --  93   *  --  88  --        Imaging:   No results found for this or any previous visit (from the past 24 hour(s)).

## 2018-07-24 NOTE — CONSULTS
Care Transition Initial Assessment - CHRISTINE  Reason For Consult: discharge planning, end of life/hospice  Met with: Family    Active Problems:    Stroke (H)       DATA  Lives With: spouse    Identified issues/concerns regarding health management: SW was consulted for discharge planning and hospice care. Patient is Delta, an 86 year-old male who was admitted on 7/21 for a stroke. CHRISTINE, along with Dora ([place] hospice navigator) and a Spanish , met with patient's wife and their children. Patient's wife is requesting patient be discharged home with hospice care.     ASSESSMENT  Cognitive Status: Patient was asleep during this meeting.  Concerns to be addressed: Discharge planning, hospice care.   PLAN  Financial costs for the patient includes N/A.  Patient given options and choices for discharge Yes.  Patient/family is agreeable to the plan?  Yes  Patient Goals and Preferences: Discharge to home with hospice.  Patient anticipates discharging to:  Home with hospice.    Kallie Summers, MSW, LICSW

## 2018-07-24 NOTE — PLAN OF CARE
Problem: Patient Care Overview  Goal: Plan of Care/Patient Progress Review  Outcome: No Change  Patient is unresponsive with labored breathing. Family and  at bedside, spoke with family about giving pt morphine to help with the labored breathing, pt wife and family in agreement with this plan. Family plans to take pt home with hospice on Wednesday.

## 2018-07-24 NOTE — PLAN OF CARE
Problem: Patient Care Overview  Goal: Plan of Care/Patient Progress Review  Patient remains unresponsive , turned , repositioned and oral care given Q 2 hourly ,  . Morphine given x1 per request .

## 2018-07-24 NOTE — PROGRESS NOTES
Hospice: Met with patients family along with [place]  and Kallie Summers   to explain the hospice program. The family has decided they would take the patient home with hospice services if the patient is stable for discharge. I explained the hospice program, services available for support, medication and equipment coverage. I answered all questions the family had. The plan would be if the patient is stable tomorrow for discharge we would meet again to sign hospice consent forms, order equipment and then plan for discharge. I will need to order from [facility] hospital bed, over the bed table and oxygen to be delivered tomorrow to patients home.   Please order the following hospice comfort meds:  Acetaminophen 650 mg supp GA every 4 hrs as needed for pain/fever #2  bisacodyl 10 mg supp GA daily as needed for constipation #2,   atropine sulfate 1% 2 to 4 drops SL as needed every 2 hrs for terminal congestion/secretions # 5 ml,   haloperidol 2mg/ml give 0.5mg to 1mg SL every 6 hrs as needed for nausea/agitation #15 ml  Lorazepam 2mg/ml give 0.25mg to 0.5mg SL every 4 hrs as needed for anxiety/restlessness #30 ml  morphine sulfate 20 mg/ml give 5 to 10 mg or 0.25 to 0.5ml SL every 2 hrs pain/dyspnea,   We will complete a POLST tomorrow and have the hospitalist sign for discharge.  Thank you for the referral. Dora Aguilar RN, BSN   Hospice Admission nurse  [phone]

## 2018-07-24 NOTE — PLAN OF CARE
Problem: Patient Care Overview  Goal: Plan of Care/Patient Progress Review  Outcome: declining  Pt is non verbal, non responsive, on comfort care, thick secretions in oropharyngeal space, oral care and suction prn repositioned q2hrs, Young patent, low output. No po intake.  Will continue to monitor.

## 2018-07-25 VITALS
BODY MASS INDEX: 22.72 KG/M2 | WEIGHT: 162.26 LBS | DIASTOLIC BLOOD PRESSURE: 81 MMHG | TEMPERATURE: 102.5 F | SYSTOLIC BLOOD PRESSURE: 130 MMHG | RESPIRATION RATE: 32 BRPM | HEIGHT: 71 IN | HEART RATE: 66 BPM | OXYGEN SATURATION: 91 %

## 2018-07-25 PROBLEM — I49.5 SSS (SICK SINUS SYNDROME) (H): Status: ACTIVE | Noted: 2018-07-25

## 2018-07-25 PROCEDURE — 99239 HOSP IP/OBS DSCHRG MGMT >30: CPT | Mod: GW | Performed by: INTERNAL MEDICINE

## 2018-07-25 PROCEDURE — 25000132 ZZH RX MED GY IP 250 OP 250 PS 637: Performed by: INTERNAL MEDICINE

## 2018-07-25 RX ADMIN — MORPHINE SULFATE 10 MG: 100 SOLUTION ORAL at 08:26

## 2018-07-25 RX ADMIN — MORPHINE SULFATE 10 MG: 100 SOLUTION ORAL at 15:35

## 2018-07-25 RX ADMIN — MORPHINE SULFATE 10 MG: 100 SOLUTION ORAL at 13:19

## 2018-07-25 ASSESSMENT — ACTIVITIES OF DAILY LIVING (ADL)
ADLS_ACUITY_SCORE: 36

## 2018-07-25 NOTE — PROGRESS NOTES
D: Patient will be transported via [facility] stretcher at 1630, due to hospice needs, patient being bed-bound and unresponsive. PCS form faxed and attached to patient's chart.

## 2018-07-25 NOTE — DISCHARGE SUMMARY
[facility]    Discharge Summary  Hospitalist    Date of Admission:  7/21/2018  Date of Discharge:  7/25/2018  Discharging Provider: Aquiles Ogden MD    Discharge Diagnoses   Principal Problem:    Large Right PCA stroke, S/P Thrombectomy, subsequent hemorrhage  Active Problems:    PAF (paroxysmal atrial fibrillation) (H)    SSS (sick sinus syndrome) (H)      History of Present Illness   86 year old male who presents with acute onset mental health around 5 PM the day of admission.  Per wife, who the patient lives with, discussed how he just suddenly stopped talking, started having weakness in the left side, facial droop. His health has otherwise been well; he just [place] with a walker the morning of admission. He has history of old stroke on the right side as well, and reportedly has some memory issues since.  Initial head ct showed:  large area of encephalomalacia in the medial  right parietal-occipital lobe most likely due to an old infarct. There  is a small area of encephalomalacia in the left frontal lobe most  likely due to an old infarct. Cerebral atrophy is present.      Code status: DNR/DNI, reportedly on some paperwork that is not available at this time. Discussed with the wife and rest of family with the [place]  present and they concur.  Hospital Course   Head CTA showed:  1. Large posterior right middle cerebral artery territory perfusion  abnormality which is primarily infarct but there may be some  peripheral penumbra.  2. Old left frontal and right occipital infarcts.     IR consulted and patient underwent thrombectomy, had subsequent cerebral hemorrhage with CT showing left thalamic and parietal hemorrage.  Options discussed with family and the elected comfort care.  Patient comfortable at time of discharge, and family is going to care care for patient at home with help of hospice.  Anticipate patient will discharge in the next 1 to 3  days.      Aquiles Ogden MD  Pager: [phone]  Cell Phone:  [phone]       Significant Results and Procedures   As above    Pending Results   These results will be followed up by Dr. Ogden  Unresulted Labs Ordered in the Past 30 Days of this Admission     Date and Time Order Name Status Description    7/22/2018 0420 ABO/Rh type and screen In process           Code Status   Comfort Care       Primary Care Physician   Bony Castro    Physical Exam   Temp: 102.5  F (39.2  C) Temp src: Axillary BP: 130/81 Pulse: 66 Heart Rate: 64 Resp: (!) 32 SpO2: 91 % O2 Device: None (Room air)    Vitals:    07/21/18 1813 07/22/18 0600   Weight: 72.4 kg (159 lb 11.2 oz) 73.6 kg (162 lb 4.1 oz)     Vital Signs with Ranges  Temp:  [100.3  F (37.9  C)-102.5  F (39.2  C)] 102.5  F (39.2  C)  Pulse:  [66] 66  Heart Rate:  [64] 64  Resp:  [26-32] 32  BP: (119-130)/(71-81) 130/81  SpO2:  [91 %] 91 %  I/O last 3 completed shifts:  In: 0   Out: 700 [Urine:700]    Exam on discharge:   Appears comfortable.  No response to voice or physical stimulation.    # Discharge Pain Plan:   - Patient currently has NO PAIN and is not being prescribed pain medications on discharge.      Discharge Disposition   Discharged to home with hospice  Condition at discharge: Terminal    Consultations This Hospital Stay   PHYSICAL THERAPY ADULT IP CONSULT  OCCUPATIONAL THERAPY ADULT IP CONSULT  SPEECH LANGUAGE PATH ADULT IP CONSULT  SOCIAL WORK IP CONSULT  SMOKING CESSATION PROGRAM IP CONSULT  PHARMACY IP CONSULT  NEUROLOGY IP CONSULT  PALLIATIVE CARE ADULT IP CONSULT  SOCIAL WORK IP CONSULT  SOCIAL WORK IP CONSULT    Time Spent on this Encounter   I spent a total of 35 minutes discharging this patient.     Discharge Orders     Home care nursing referral     Reason for your hospital stay   You were admitted for stroke which resulted in bleeding in your brain.     Follow-up and recommended labs and tests    Follow up with hospice care.      Activity   Your activity upon discharge: activity as tolerated     MD face to face encounter   Documentation of Face to Face and Certification for Home Health Services    I certify that patient: Delta Otoole is under my care and that I, or a nurse practitioner or physician's assistant working with me, had a face-to-face encounter that meets the physician face-to-face encounter requirements with this patient on: 7/25/2018.    This encounter with the patient was in whole, or in part, for the following medical condition, which is the primary reason for home health care: stroke with subsequent hemorrhage, on hospice care.    I certify that, based on my findings, the following services are medically necessary home health services: Nursing.    My clinical findings support the need for the above services because: Nurse is needed: To provide caregiver training to assist with: comfort care.    Further, I certify that my clinical findings support that this patient is homebound (i.e. absences from home require considerable and taxing effort and are for medical reasons or [employer] services or infrequently or of short duration when for other reasons) because: Patient is bedbound due to: stroke.    Based on the above findings. I certify that this patient is confined to the home and needs intermittent skilled nursing care, physical therapy and/or speech therapy.  The patient is under my care, and I have initiated the establishment of the plan of care.  This patient will be followed by a physician who will periodically review the plan of care.  Physician/Provider to provide follow up care: Bony Castro    Attending hospital physician (the Medicare certified [facility] provider): Aquiles Morales  Physician Signature: See electronic signature associated with these discharge orders.  Date: 7/25/2018     Discharge Instructions   Call Dr. Morales at Pager [phone] if questions, or Cell Phone [phone].      DNR/DNI     Diet   Follow this diet upon discharge:  Food and fluid for comfort       Discharge Medications   Current Discharge Medication List      START taking these medications    Details   acetaminophen (TYLENOL) 650 MG Suppository Place 1 suppository (650 mg) rectally every 4 hours as needed for fever or mild pain  Qty: 2 suppository, Refills: 0    Associated Diagnoses: End of life care      atropine 1 % ophthalmic solution Place 2-4 drops under the tongue every 2 hours as needed for other (secretions)  Qty: 5 mL, Refills: 0    Associated Diagnoses: End of life care      bisacodyl (DULCOLAX) 10 MG Suppository Place 1 suppository (10 mg) rectally daily as needed for constipation  Qty: 2 suppository, Refills: 0    Associated Diagnoses: End of life care      haloperidol (HALDOL) 2 MG/ML (HIGH CONC) solution Place 0.25-0.5 mLs (0.5-1 mg) under the tongue every 6 hours as needed for agitation or other (nausea)  Qty: 15 mL, Refills: 0    Associated Diagnoses: End of life care      LORazepam (LORAZEPAM INTENSOL) 2 MG/ML (HIGH CONC) solution Place 0.13-0.25 mLs (0.26-0.5 mg) under the tongue every 4 hours as needed for anxiety or other (restlessness)  Qty: 30 mL, Refills: 0    Associated Diagnoses: End of life care      morphine sulfate HIGH CONCENTRATE (ROXANOL) 10 mg/0.5 mL (HIGH CONC) solution Place 0.25-0.5 mLs (5-10 mg) under the tongue every 2 hours as needed for moderate to severe pain or other (or dyspnea)  Qty: 30 mL, Refills: 0    Associated Diagnoses: End of life care         STOP taking these medications       aspirin 81 MG tablet Comments:   Reason for Stopping:         ATORVASTATIN CALCIUM PO Comments:   Reason for Stopping:         DONEPEZIL HCL PO Comments:   Reason for Stopping:         Multiple Vitamins-Minerals (ICAPS AREDS 2 PO) Comments:   Reason for Stopping:         polyethylene glycol (MIRALAX/GLYCOLAX) Packet Comments:   Reason for Stopping:         VITAMIN D, CHOLECALCIFEROL, PO Comments:    Reason for Stopping:             Allergies   Allergies   Allergen Reactions     No Clinical Screening - See Comments      ekg patches= rash     Data   Most Recent 3 CBC's:  Recent Labs   Lab Test  07/22/18   0430 07/21/18 1812 02/04/16   WBC  11.1*  5.7  7.1   HGB  13.3  14.2  14.6   MCV  92  94  93   PLT  239  234  233      Most Recent 3 BMP's:  Recent Labs   Lab Test  07/22/18   0430  07/21/18   1812 02/04/16   05/18/15   1627   NA  138  139  139   < >  139   POTASSIUM  4.2  4.6  4.3   < >  5.0   CHLORIDE  106  105  106   < >  104   CO2  24  28   --    --   23   BUN  19  26  29   < >  27   CR  0.87  1.12  1.22   < >  1.16   ANIONGAP  8  6  9   < >  17   JALEESA  8.1*  8.8  9.2   < >  8.9   GLC  145*  93  99   < >  81    < > = values in this interval not displayed.     Most Recent 2 LFT's:  Recent Labs   Lab Test  07/22/18 0430   AST  31   ALT  20   ALKPHOS  62   BILITOTAL  0.6     Most Recent INR's and Anticoagulation Dosing History:  Anticoagulation Dose History     Recent Dosing and Labs Latest Ref Rng & Units 3/19/2015 7/21/2018 7/21/2018 7/22/2018    INR 0.86 - 1.14 1.20(H) 1.14 1.47(H) 1.37(H)        Most Recent 3 Troponin's:  Recent Labs   Lab Test  07/22/18   0430  03/20/15   0120  03/19/15   2000   TROPI  <0.015  0.227*  0.242*     Most Recent Cholesterol Panel:  Recent Labs   Lab Test  07/22/18   0430   CHOL  128   LDL  62   HDL  56   TRIG  50     Most Recent 6 Bacteria Isolates From Any Culture (See EPIC Reports for Culture Details):  Recent Labs   Lab Test  03/10/11   2015   CULT  No growth after 6 days     Most Recent TSH, T4 and A1c Labs:  Recent Labs   Lab Test  07/22/18   0430  03/20/15   0515   TSH   --   0.86   A1C  5.2   --

## 2018-07-25 NOTE — PLAN OF CARE
Problem: Patient Care Overview  Goal: Plan of Care/Patient Progress Review  Outcome: Change based on patient need/priority Date Met: 07/25/18

## 2018-07-25 NOTE — PLAN OF CARE
Problem: Patient Care Overview  Goal: Plan of Care/Patient Progress Review  Outcome: Declining  Pt is non responsive, breathing irregular at times, tachypnic with turning, dyspnea relieved with morphine SL at times, pt unable to hold 2cc/10mg morphine in mouth and med spills, 0.5cc/10mg is given with better absorption, NPO, oral care and suction prn Pt pending transfer to hospice today.

## 2018-07-25 NOTE — PLAN OF CARE
Problem: Stroke (Hemorrhagic) (Adult)  Goal: Signs and Symptoms of Listed Potential Problems Will be Absent, Minimized or Managed (Stroke)  Signs and symptoms of listed potential problems will be absent, minimized or managed by discharge/transition of care (reference Stroke (Hemorrhagic) (Adult) CPG).   Outcome: No Change  Somulent, VSS only checked per family request, pain controlled with oral morphine, drsg CDI,, repositioned for comfort, voiding adequately in patent hansen, home via cart to hospice, continue to monitor.

## 2018-07-25 NOTE — PROGRESS NOTES
Hospice: Met with spouse again to have the hospice consents signed. [place]  present. We completed a POLST which was placed on front of the hospital chart for hospitalist signature. Spouse has been given her signed copies of the hospice consent forms along with the hospice handbook with the 24 hr number. I have ordered from [facility] a hospital bed with half rails, obt, raz overlay and oxygen concentrator to be delivered to home between 12 and 4 p today. The hospice admission team will complete the admission Thursday at 1p and have a Iraqi  available. Hospice comfort meds to be filled at the [facility] pharmacy and sent home with the pt. I have encouraged the family to call the 24 hr number for any questions, concerns or if the pt dies. They verbalized understanding. Thank you. Dora Aguilar RN, BSN   Hospice Admission nurse  [phone]

## 2018-10-11 ENCOUNTER — CARE COORDINATION (OUTPATIENT)
Dept: MEDSURG UNIT | Facility: CLINIC | Age: 83
End: 2018-10-11

## 2018-10-11 NOTE — PROGRESS NOTES
Writer checked pt's chart on October 11, 2018 to call to obtain mRS approximately 90 days after CVA. Writer noted pt discharged home with family on hospice and therefore not appropriate to call to obtain mRS.